# Patient Record
Sex: MALE | Race: WHITE | NOT HISPANIC OR LATINO
[De-identification: names, ages, dates, MRNs, and addresses within clinical notes are randomized per-mention and may not be internally consistent; named-entity substitution may affect disease eponyms.]

---

## 2020-02-25 ENCOUNTER — APPOINTMENT (OUTPATIENT)
Dept: MRI IMAGING | Facility: CLINIC | Age: 60
End: 2020-02-25
Payer: COMMERCIAL

## 2020-02-25 ENCOUNTER — OUTPATIENT (OUTPATIENT)
Dept: OUTPATIENT SERVICES | Facility: HOSPITAL | Age: 60
LOS: 1 days | End: 2020-02-25
Payer: COMMERCIAL

## 2020-02-25 DIAGNOSIS — Z00.8 ENCOUNTER FOR OTHER GENERAL EXAMINATION: ICD-10-CM

## 2020-02-25 PROCEDURE — 70551 MRI BRAIN STEM W/O DYE: CPT | Mod: 26

## 2020-02-25 PROCEDURE — 70551 MRI BRAIN STEM W/O DYE: CPT

## 2021-04-21 ENCOUNTER — INPATIENT (INPATIENT)
Facility: HOSPITAL | Age: 61
LOS: 5 days | Discharge: HOME | End: 2021-04-27
Attending: INTERNAL MEDICINE | Admitting: INTERNAL MEDICINE
Payer: COMMERCIAL

## 2021-04-21 VITALS
TEMPERATURE: 98 F | SYSTOLIC BLOOD PRESSURE: 116 MMHG | DIASTOLIC BLOOD PRESSURE: 63 MMHG | HEART RATE: 107 BPM | OXYGEN SATURATION: 97 % | RESPIRATION RATE: 16 BRPM

## 2021-04-21 DIAGNOSIS — Z98.84 BARIATRIC SURGERY STATUS: Chronic | ICD-10-CM

## 2021-04-21 LAB
ALBUMIN SERPL ELPH-MCNC: 4.4 G/DL — SIGNIFICANT CHANGE UP (ref 3.3–5)
ALP SERPL-CCNC: 70 U/L — SIGNIFICANT CHANGE UP (ref 40–120)
ALT FLD-CCNC: 25 U/L — SIGNIFICANT CHANGE UP (ref 4–41)
ANION GAP SERPL CALC-SCNC: 14 MMOL/L — SIGNIFICANT CHANGE UP (ref 7–14)
AST SERPL-CCNC: 21 U/L — SIGNIFICANT CHANGE UP (ref 4–40)
BASOPHILS # BLD AUTO: 0.05 K/UL — SIGNIFICANT CHANGE UP (ref 0–0.2)
BASOPHILS NFR BLD AUTO: 0.5 % — SIGNIFICANT CHANGE UP (ref 0–2)
BILIRUB SERPL-MCNC: 0.6 MG/DL — SIGNIFICANT CHANGE UP (ref 0.2–1.2)
BLOOD GAS VENOUS COMPREHENSIVE RESULT: SIGNIFICANT CHANGE UP
BUN SERPL-MCNC: 12 MG/DL — SIGNIFICANT CHANGE UP (ref 7–23)
CALCIUM SERPL-MCNC: 9.9 MG/DL — SIGNIFICANT CHANGE UP (ref 8.4–10.5)
CHLORIDE SERPL-SCNC: 101 MMOL/L — SIGNIFICANT CHANGE UP (ref 98–107)
CO2 SERPL-SCNC: 23 MMOL/L — SIGNIFICANT CHANGE UP (ref 22–31)
CREAT SERPL-MCNC: 0.86 MG/DL — SIGNIFICANT CHANGE UP (ref 0.5–1.3)
EOSINOPHIL # BLD AUTO: 0.17 K/UL — SIGNIFICANT CHANGE UP (ref 0–0.5)
EOSINOPHIL NFR BLD AUTO: 1.6 % — SIGNIFICANT CHANGE UP (ref 0–6)
GLUCOSE SERPL-MCNC: 71 MG/DL — SIGNIFICANT CHANGE UP (ref 70–99)
HCT VFR BLD CALC: 49.9 % — SIGNIFICANT CHANGE UP (ref 39–50)
HGB BLD-MCNC: 17 G/DL — SIGNIFICANT CHANGE UP (ref 13–17)
IANC: 7.16 K/UL — SIGNIFICANT CHANGE UP (ref 1.5–8.5)
IMM GRANULOCYTES NFR BLD AUTO: 0.5 % — SIGNIFICANT CHANGE UP (ref 0–1.5)
LYMPHOCYTES # BLD AUTO: 2.36 K/UL — SIGNIFICANT CHANGE UP (ref 1–3.3)
LYMPHOCYTES # BLD AUTO: 21.9 % — SIGNIFICANT CHANGE UP (ref 13–44)
MAGNESIUM SERPL-MCNC: 2.2 MG/DL — SIGNIFICANT CHANGE UP (ref 1.6–2.6)
MCHC RBC-ENTMCNC: 32.6 PG — SIGNIFICANT CHANGE UP (ref 27–34)
MCHC RBC-ENTMCNC: 34.1 GM/DL — SIGNIFICANT CHANGE UP (ref 32–36)
MCV RBC AUTO: 95.8 FL — SIGNIFICANT CHANGE UP (ref 80–100)
MONOCYTES # BLD AUTO: 1 K/UL — HIGH (ref 0–0.9)
MONOCYTES NFR BLD AUTO: 9.3 % — SIGNIFICANT CHANGE UP (ref 2–14)
NEUTROPHILS # BLD AUTO: 7.16 K/UL — SIGNIFICANT CHANGE UP (ref 1.8–7.4)
NEUTROPHILS NFR BLD AUTO: 66.2 % — SIGNIFICANT CHANGE UP (ref 43–77)
NRBC # BLD: 0 /100 WBCS — SIGNIFICANT CHANGE UP
NRBC # FLD: 0 K/UL — SIGNIFICANT CHANGE UP
PHOSPHATE SERPL-MCNC: 5.3 MG/DL — HIGH (ref 2.5–4.5)
PLATELET # BLD AUTO: 285 K/UL — SIGNIFICANT CHANGE UP (ref 150–400)
POTASSIUM SERPL-MCNC: 4 MMOL/L — SIGNIFICANT CHANGE UP (ref 3.5–5.3)
POTASSIUM SERPL-SCNC: 4 MMOL/L — SIGNIFICANT CHANGE UP (ref 3.5–5.3)
PROT SERPL-MCNC: 7.3 G/DL — SIGNIFICANT CHANGE UP (ref 6–8.3)
RBC # BLD: 5.21 M/UL — SIGNIFICANT CHANGE UP (ref 4.2–5.8)
RBC # FLD: 12.2 % — SIGNIFICANT CHANGE UP (ref 10.3–14.5)
SODIUM SERPL-SCNC: 138 MMOL/L — SIGNIFICANT CHANGE UP (ref 135–145)
WBC # BLD: 10.79 K/UL — HIGH (ref 3.8–10.5)
WBC # FLD AUTO: 10.79 K/UL — HIGH (ref 3.8–10.5)

## 2021-04-21 PROCEDURE — 71275 CT ANGIOGRAPHY CHEST: CPT | Mod: 26

## 2021-04-21 PROCEDURE — 99285 EMERGENCY DEPT VISIT HI MDM: CPT

## 2021-04-21 PROCEDURE — 74174 CTA ABD&PLVS W/CONTRAST: CPT | Mod: 26

## 2021-04-21 PROCEDURE — 70498 CT ANGIOGRAPHY NECK: CPT | Mod: 26

## 2021-04-21 PROCEDURE — 70496 CT ANGIOGRAPHY HEAD: CPT | Mod: 26

## 2021-04-21 PROCEDURE — 71045 X-RAY EXAM CHEST 1 VIEW: CPT | Mod: 26

## 2021-04-21 RX ORDER — SODIUM CHLORIDE 9 MG/ML
1000 INJECTION, SOLUTION INTRAVENOUS ONCE
Refills: 0 | Status: COMPLETED | OUTPATIENT
Start: 2021-04-21 | End: 2021-04-21

## 2021-04-21 RX ADMIN — SODIUM CHLORIDE 1000 MILLILITER(S): 9 INJECTION, SOLUTION INTRAVENOUS at 17:39

## 2021-04-21 NOTE — ED PROVIDER NOTE - PROGRESS NOTE DETAILS
HARISH PRIEST: Patient signed out to me to f/u CTA head & neck, admit for syncope. Discussed with attending, noted, /64 ([id number]), given back pain & syncope, will order CTA chest & abd/pelvis to evaluate for dissection. Patient reassessed, sitting comfortably in bed in NAD, denies any chest pain; admits having lower back pain w/o radiculopathy, 10 days ago after some shopping at the supermarket. Will continue to monitor and reassess. HARISH PRIEST: CTA head & neck: neg acute finding, CTA chest & A/P neg dissection. Spoke with tele doc, Dr. Alvarez, accepted pt for admission. MAR text paged. Pt admitted for syncope.

## 2021-04-21 NOTE — ED ADULT TRIAGE NOTE - CHIEF COMPLAINT QUOTE
Pt presents to ED via EMS from home with c/o syncopal episode. Pt reports feeling dizzy when standing up from sitting down. Pt has hx of DM.

## 2021-04-21 NOTE — ED PROVIDER NOTE - OBJECTIVE STATEMENT
61M pmh DM, lap band surg 1/2021 had neg cardiac workup w/ stress + echo 9/2020 @ [facility] presents to ED for 1 episode syncope at 730 this am after getting up from bed, pt states he felt lightheaded, tried to hold himself up but felt dizzy, only remembers being on floor afterwards, he climbed in bed, slept for a few hours, stood up to urinate and felt like he was going to pass out, called 911. Pt denies chest pain, denies sob, denies any other symptoms, no prior syncope history, pt states he has been taking his medication as normal, no other changes. Pt denies room spinning dizziness.

## 2021-04-21 NOTE — ED PROVIDER NOTE - PHYSICAL EXAMINATION
CONSTITUTIONAL: well-appearing, in NAD  SKIN: Warm dry, normal skin turgor  HEAD: NCAT  EYES: EOMI, PERRLA, no scleral icterus, conjunctiva pink  ENT: normal pharynx with no erythema or exudates  NECK: Supple; non tender. Full ROM.  CARD: RRR, no murmurs.  RESP: clear to ausculation b/l. No crackles or wheezing.  ABD: soft, non-tender, non-distended, no rebound or guarding.  EXT: No pedal edema, no calf tenderness  NEURO: normal motor. normal sensory. CN II-XII intact. Cerebellar testing normal. Normal gait but pt felt lightheaded after testing.  PSYCH: Cooperative, appropriate.

## 2021-04-21 NOTE — ED PROVIDER NOTE - NS ED ROS FT
Constitutional:  (-) fever, (-) chills, (-) lethargy  Eyes:  (-) eye pain (-) visual changes  ENMT: (-) nasal discharge, (-) sore throat. (-) neck pain or stiffness  Cardiac: (-) chest pain (-) palpitations  Respiratory:  (-) cough (-) respiratory distress.   GI:  (-) nausea (-) vomiting (-) diarrhea (-) abdominal pain.  :  (-) dysuria (-) frequency (-) burning.  MS:  (-) back pain (-) joint pain.  Neuro:  (-) headache (-) numbness (-) tingling (-) focal weakness (+) lightheadedness  Skin:  (-) rash  Except as documented in the HPI,  all other systems are negative

## 2021-04-21 NOTE — ED PROVIDER NOTE - CLINICAL SUMMARY MEDICAL DECISION MAKING FREE TEXT BOX
61M pmh DM, lap band surg 1/2021 had neg cardiac workup w/ stress + echo 9/2020 @ [facility] presents to ED for 1 episode syncope at 730 this am after getting up from bed, pt states he felt lightheaded, tried to hold himself up but felt dizzy, had syncopal episode + subsequent episode new onset no hx, concern for intracranial process, cta head neck cbc cmp mag phos r/o electrolyte abnormality ekg r/o dysrhythmia

## 2021-04-21 NOTE — ED PROVIDER NOTE - ATTENDING CONTRIBUTION TO CARE
Attending note:   After face to face evaluation of this patient, I concur with above noted hx, pe, and care plan for this patient.  Silverio: 61 yom with DM, lap band, "negative cardiac workup" in Sept 20 after having back pain. This am woke up with back pain, went back to sleep, woke up feeling lightheaded and tried to grab something but woke up on the ground, went back to bed. Sxs returned and still felt lightheaded but called cab to go to [facility] but then had near syncope and called 911. Pt has no previous episode of similar sxs, no cp, no sob. Back pain resolved. Pt is well appearing, no distress, clear lungs, normal cardiac, abd soft and non tender, pulses strong in all ext, no CVAT, neuro and msk unremarkable. EKG borderline tachy, CT head with concern for posterior issues, and CTA for dissection given back pain and syncope. Labs, tele monitor. and admission.

## 2021-04-21 NOTE — ED ADULT NURSE REASSESSMENT NOTE - NS ED NURSE REASSESS COMMENT FT1
reprot received from MIKAEL Alejo pt A&Ox4 states he is a diabetic and has not eaten all day, asks what he is waiting for. plan of care explained and pt provided with meal tray and water pitcher after clearing with MD Silverio. pt awake and alert appears in no distress at this time.

## 2021-04-22 DIAGNOSIS — E11.9 TYPE 2 DIABETES MELLITUS WITHOUT COMPLICATIONS: ICD-10-CM

## 2021-04-22 DIAGNOSIS — R55 SYNCOPE AND COLLAPSE: ICD-10-CM

## 2021-04-22 DIAGNOSIS — K95.09 OTHER COMPLICATIONS OF GASTRIC BAND PROCEDURE: ICD-10-CM

## 2021-04-22 LAB
ANION GAP SERPL CALC-SCNC: 12 MMOL/L — SIGNIFICANT CHANGE UP (ref 7–14)
APPEARANCE UR: ABNORMAL
BILIRUB UR-MCNC: NEGATIVE — SIGNIFICANT CHANGE UP
BUN SERPL-MCNC: 10 MG/DL — SIGNIFICANT CHANGE UP (ref 7–23)
CALCIUM SERPL-MCNC: 9.4 MG/DL — SIGNIFICANT CHANGE UP (ref 8.4–10.5)
CHLORIDE SERPL-SCNC: 98 MMOL/L — SIGNIFICANT CHANGE UP (ref 98–107)
CO2 SERPL-SCNC: 26 MMOL/L — SIGNIFICANT CHANGE UP (ref 22–31)
COLOR SPEC: YELLOW — SIGNIFICANT CHANGE UP
CREAT SERPL-MCNC: 0.88 MG/DL — SIGNIFICANT CHANGE UP (ref 0.5–1.3)
DIFF PNL FLD: NEGATIVE — SIGNIFICANT CHANGE UP
GLUCOSE BLDC GLUCOMTR-MCNC: 100 MG/DL — HIGH (ref 70–99)
GLUCOSE BLDC GLUCOMTR-MCNC: 103 MG/DL — HIGH (ref 70–99)
GLUCOSE BLDC GLUCOMTR-MCNC: 106 MG/DL — HIGH (ref 70–99)
GLUCOSE BLDC GLUCOMTR-MCNC: 119 MG/DL — HIGH (ref 70–99)
GLUCOSE SERPL-MCNC: 142 MG/DL — HIGH (ref 70–99)
GLUCOSE UR QL: ABNORMAL
HCT VFR BLD CALC: 49.1 % — SIGNIFICANT CHANGE UP (ref 39–50)
HGB BLD-MCNC: 16.6 G/DL — SIGNIFICANT CHANGE UP (ref 13–17)
KETONES UR-MCNC: NEGATIVE — SIGNIFICANT CHANGE UP
LEUKOCYTE ESTERASE UR-ACNC: NEGATIVE — SIGNIFICANT CHANGE UP
MAGNESIUM SERPL-MCNC: 2.2 MG/DL — SIGNIFICANT CHANGE UP (ref 1.6–2.6)
MCHC RBC-ENTMCNC: 33 PG — SIGNIFICANT CHANGE UP (ref 27–34)
MCHC RBC-ENTMCNC: 33.8 GM/DL — SIGNIFICANT CHANGE UP (ref 32–36)
MCV RBC AUTO: 97.6 FL — SIGNIFICANT CHANGE UP (ref 80–100)
NITRITE UR-MCNC: NEGATIVE — SIGNIFICANT CHANGE UP
NRBC # BLD: 0 /100 WBCS — SIGNIFICANT CHANGE UP
NRBC # FLD: 0 K/UL — SIGNIFICANT CHANGE UP
PH UR: 6 — SIGNIFICANT CHANGE UP (ref 5–8)
PHOSPHATE SERPL-MCNC: 4.2 MG/DL — SIGNIFICANT CHANGE UP (ref 2.5–4.5)
PLATELET # BLD AUTO: 247 K/UL — SIGNIFICANT CHANGE UP (ref 150–400)
POTASSIUM SERPL-MCNC: 4 MMOL/L — SIGNIFICANT CHANGE UP (ref 3.5–5.3)
POTASSIUM SERPL-SCNC: 4 MMOL/L — SIGNIFICANT CHANGE UP (ref 3.5–5.3)
PROT UR-MCNC: NEGATIVE — SIGNIFICANT CHANGE UP
RBC # BLD: 5.03 M/UL — SIGNIFICANT CHANGE UP (ref 4.2–5.8)
RBC # FLD: 12.5 % — SIGNIFICANT CHANGE UP (ref 10.3–14.5)
SARS-COV-2 RNA SPEC QL NAA+PROBE: SIGNIFICANT CHANGE UP
SODIUM SERPL-SCNC: 136 MMOL/L — SIGNIFICANT CHANGE UP (ref 135–145)
SP GR SPEC: 1.04 — HIGH (ref 1.01–1.02)
UROBILINOGEN FLD QL: ABNORMAL
WBC # BLD: 7.81 K/UL — SIGNIFICANT CHANGE UP (ref 3.8–10.5)
WBC # FLD AUTO: 7.81 K/UL — SIGNIFICANT CHANGE UP (ref 3.8–10.5)

## 2021-04-22 PROCEDURE — 99223 1ST HOSP IP/OBS HIGH 75: CPT

## 2021-04-22 RX ORDER — DEXTROSE 50 % IN WATER 50 %
15 SYRINGE (ML) INTRAVENOUS ONCE
Refills: 0 | Status: DISCONTINUED | OUTPATIENT
Start: 2021-04-22 | End: 2021-04-27

## 2021-04-22 RX ORDER — INSULIN LISPRO 100/ML
VIAL (ML) SUBCUTANEOUS
Refills: 0 | Status: DISCONTINUED | OUTPATIENT
Start: 2021-04-22 | End: 2021-04-27

## 2021-04-22 RX ORDER — DEXTROSE 50 % IN WATER 50 %
25 SYRINGE (ML) INTRAVENOUS ONCE
Refills: 0 | Status: DISCONTINUED | OUTPATIENT
Start: 2021-04-22 | End: 2021-04-27

## 2021-04-22 RX ORDER — SODIUM CHLORIDE 9 MG/ML
1000 INJECTION INTRAMUSCULAR; INTRAVENOUS; SUBCUTANEOUS
Refills: 0 | Status: DISCONTINUED | OUTPATIENT
Start: 2021-04-22 | End: 2021-04-27

## 2021-04-22 RX ORDER — METFORMIN HYDROCHLORIDE 850 MG/1
0 TABLET ORAL
Qty: 0 | Refills: 0 | DISCHARGE

## 2021-04-22 RX ORDER — EMPAGLIFLOZIN 10 MG/1
0 TABLET, FILM COATED ORAL
Qty: 0 | Refills: 0 | DISCHARGE

## 2021-04-22 RX ORDER — SODIUM CHLORIDE 9 MG/ML
1000 INJECTION, SOLUTION INTRAVENOUS
Refills: 0 | Status: DISCONTINUED | OUTPATIENT
Start: 2021-04-22 | End: 2021-04-27

## 2021-04-22 RX ORDER — GLUCAGON INJECTION, SOLUTION 0.5 MG/.1ML
1 INJECTION, SOLUTION SUBCUTANEOUS ONCE
Refills: 0 | Status: DISCONTINUED | OUTPATIENT
Start: 2021-04-22 | End: 2021-04-27

## 2021-04-22 RX ORDER — INSULIN LISPRO 100/ML
VIAL (ML) SUBCUTANEOUS AT BEDTIME
Refills: 0 | Status: DISCONTINUED | OUTPATIENT
Start: 2021-04-22 | End: 2021-04-27

## 2021-04-22 RX ORDER — DEXTROSE 50 % IN WATER 50 %
12.5 SYRINGE (ML) INTRAVENOUS ONCE
Refills: 0 | Status: DISCONTINUED | OUTPATIENT
Start: 2021-04-22 | End: 2021-04-27

## 2021-04-22 RX ADMIN — SODIUM CHLORIDE 100 MILLILITER(S): 9 INJECTION INTRAMUSCULAR; INTRAVENOUS; SUBCUTANEOUS at 12:07

## 2021-04-22 NOTE — H&P ADULT - HISTORY OF PRESENT ILLNESS
Patient is a 62 y/o M PMH DM2, lap band surg 1/2021 had neg cardiac workup w/ stress + echo 9/2020 @ [facility] p/w syncope 4/21 in AM. Reports feeling groggy upon awakening on 4/21, felt lightheaded when standing up, went back to bed. Woke up several hours later, ate breakfast, stood up, felt lightheaded again, unsteady, woke up on the floor. Reports no recent illness, regular PO intake, FS normal at the times of incidents.

## 2021-04-22 NOTE — H&P ADULT - ASSESSMENT
61 M PMH DM2, lap band surg 1/2021 had neg cardiac workup w/ stress + echo 9/2020 @ [facility] p/w syncope

## 2021-04-22 NOTE — H&P ADULT - NSHPPHYSICALEXAM_GEN_ALL_CORE
T(C): 36.7 (04-22-21 @ 01:45), Max: 36.9 (04-21-21 @ 16:47)  HR: 84 (04-22-21 @ 01:45) (84 - 107)  BP: 121/69 (04-22-21 @ 01:45) (100/64 - 121/69)  RR: 21 (04-22-21 @ 01:45) (16 - 21)  SpO2: 98% (04-22-21 @ 01:45) (97% - 98%)    Constitutional: NAD, well-developed, well-nourished  Ears, Nose, Mouth, and Throat: normal external ears and nose, normal hearing, moist oral mucosa  Eyes: normal conjunctiva, EOMI, PERRL  Neck: supple, no JVD  Respiratory: Clear to auscultation bilaterally. No wheezes, rales or rhonchi. Normal respiratory effort  Cardiovascular: RRR, no M/R/G, no edema, 2+ Peripheral Pulses  Gastrointestinal: soft, nontender, nondistended, +BS, no hernia  Skin: warm, dry, no rash  Neurologic: sensation grossly intact, CN grossly intact, non-focal exam  Musculoskeletal: no clubbing, no cyanosis, no joint swelling  Psychiatric: AOX3, appropriate mood, affect

## 2021-04-22 NOTE — CONSULT NOTE ADULT - ATTENDING COMMENTS
Patient seen and examined.  Agree with above.   Admitted with syncope - found to have orthostatic hypotension   IVF and repeat orthostatics  Check TTE  EP eval for possible ILR    Dilan Rolon MD

## 2021-04-22 NOTE — PROVIDER CONTACT NOTE (OTHER) - ASSESSMENT
Patient orthostatics positive. Patient endorses dizziness during orthostatic check. Patient denies chest pain, headache, shortness of breath.

## 2021-04-23 LAB
A1C WITH ESTIMATED AVERAGE GLUCOSE RESULT: 5.9 % — HIGH (ref 4–5.6)
ANION GAP SERPL CALC-SCNC: 9 MMOL/L — SIGNIFICANT CHANGE UP (ref 7–14)
BUN SERPL-MCNC: 10 MG/DL — SIGNIFICANT CHANGE UP (ref 7–23)
CALCIUM SERPL-MCNC: 8.9 MG/DL — SIGNIFICANT CHANGE UP (ref 8.4–10.5)
CHLORIDE SERPL-SCNC: 103 MMOL/L — SIGNIFICANT CHANGE UP (ref 98–107)
CO2 SERPL-SCNC: 23 MMOL/L — SIGNIFICANT CHANGE UP (ref 22–31)
COVID-19 SPIKE DOMAIN AB INTERP: POSITIVE
COVID-19 SPIKE DOMAIN ANTIBODY RESULT: >250 U/ML — HIGH
CREAT SERPL-MCNC: 0.75 MG/DL — SIGNIFICANT CHANGE UP (ref 0.5–1.3)
ESTIMATED AVERAGE GLUCOSE: 123 MG/DL — HIGH (ref 68–114)
GLUCOSE BLDC GLUCOMTR-MCNC: 115 MG/DL — HIGH (ref 70–99)
GLUCOSE BLDC GLUCOMTR-MCNC: 119 MG/DL — HIGH (ref 70–99)
GLUCOSE BLDC GLUCOMTR-MCNC: 129 MG/DL — HIGH (ref 70–99)
GLUCOSE BLDC GLUCOMTR-MCNC: 99 MG/DL — SIGNIFICANT CHANGE UP (ref 70–99)
GLUCOSE SERPL-MCNC: 109 MG/DL — HIGH (ref 70–99)
HCV AB S/CO SERPL IA: 0.1 S/CO — SIGNIFICANT CHANGE UP (ref 0–0.99)
HCV AB SERPL-IMP: SIGNIFICANT CHANGE UP
MAGNESIUM SERPL-MCNC: 2.3 MG/DL — SIGNIFICANT CHANGE UP (ref 1.6–2.6)
PHOSPHATE SERPL-MCNC: 3.9 MG/DL — SIGNIFICANT CHANGE UP (ref 2.5–4.5)
POTASSIUM SERPL-MCNC: 4 MMOL/L — SIGNIFICANT CHANGE UP (ref 3.5–5.3)
POTASSIUM SERPL-SCNC: 4 MMOL/L — SIGNIFICANT CHANGE UP (ref 3.5–5.3)
SARS-COV-2 IGG+IGM SERPL QL IA: >250 U/ML — HIGH
SARS-COV-2 IGG+IGM SERPL QL IA: POSITIVE
SODIUM SERPL-SCNC: 135 MMOL/L — SIGNIFICANT CHANGE UP (ref 135–145)

## 2021-04-23 RX ORDER — THIAMINE MONONITRATE (VIT B1) 100 MG
500 TABLET ORAL THREE TIMES A DAY
Refills: 0 | Status: COMPLETED | OUTPATIENT
Start: 2021-04-23 | End: 2021-04-24

## 2021-04-23 RX ORDER — MECLIZINE HCL 12.5 MG
12.5 TABLET ORAL
Refills: 0 | Status: DISCONTINUED | OUTPATIENT
Start: 2021-04-23 | End: 2021-04-26

## 2021-04-23 RX ORDER — LIDOCAINE 4 G/100G
1 CREAM TOPICAL DAILY
Refills: 0 | Status: DISCONTINUED | OUTPATIENT
Start: 2021-04-23 | End: 2021-04-27

## 2021-04-23 RX ADMIN — Medication 105 MILLIGRAM(S): at 23:42

## 2021-04-23 RX ADMIN — Medication 12.5 MILLIGRAM(S): at 23:41

## 2021-04-23 RX ADMIN — LIDOCAINE 1 PATCH: 4 CREAM TOPICAL at 20:37

## 2021-04-23 NOTE — PROGRESS NOTE ADULT - SUBJECTIVE AND OBJECTIVE BOX
EP Attending    HISTORY OF PRESENT ILLNESS:   Patient is a 62 y/o M PMH DM2, lap band surg 1/2021 had neg cardiac workup w/ stress + echo 9/2020 @ [facility] p/w syncope 4/21 in AM. Reports feeling groggy upon awakening on 4/21, felt lightheaded when standing up, went back to bed. Woke up several hours later, ate breakfast, stood up, felt lightheaded again, unsteady, woke up on the floor. Reports no recent illness, regular PO intake, FS normal at the times of incidents. (22 Apr 2021 05:10)    Mr Hahn reports undergoing lap-band surgery ~4-5 months ago, and has done well since with ~50lbs of weight loss.  Often regurgitates solid foods that don't fit thru the band. He reports being happy w/ results and is working his diet around this issue.  He describes multiple episodes of postural collapse with loss of consciousness on the day of admission, starting first-thing in the morning upon waking up, and not improving with eating a few small meals and having further rest in bed.  He describes what initially sounds like a vertiginous component of dysequilibrium, "walking like hes drunk... bouncing from wall to wall unable to maintain a stable direction", but then develops a presyncopal lightheadedness with blurry vision and feeling 'foggy'.  He then recalls gaining consciousness on the floor without recalling falling down.  No significant injuries sustained.  Symptoms worsen with lying-->siting, and sitting-->standing.  He has only transferred from bed-to-wheelchair and vice versa in the ED, with symptoms, and has not walked in the ED for fear of fainting again.  He has no prior episodes of lightheadedness or fainting.    He is not experiencing any palpitations, angina, orthopnea/PND, pleuritic chest pain or leg cramping.  Has chronic low-back-pain that is active at this time.  He does not drink smoke or use drugs.  A 10 pt ROS is otherwise negative.    4/23- feeling a bit better today. able to ambulate in room with some lightheaded "head-buzzing" sensation but no falls or near-falls.  no headache.  no palpitations.  remarks that orthostatic vitals were performed and he still has large increases in HR from sitting to standing (20+bpm)    dextrose 40% Gel 15 Gram(s) Oral once  dextrose 5%. 1000 milliLiter(s) IV Continuous <Continuous>  dextrose 5%. 1000 milliLiter(s) IV Continuous <Continuous>  dextrose 50% Injectable 25 Gram(s) IV Push once  dextrose 50% Injectable 12.5 Gram(s) IV Push once  dextrose 50% Injectable 25 Gram(s) IV Push once  glucagon  Injectable 1 milliGRAM(s) IntraMuscular once  insulin lispro (ADMELOG) corrective regimen sliding scale   SubCutaneous three times a day before meals  insulin lispro (ADMELOG) corrective regimen sliding scale   SubCutaneous at bedtime  sodium chloride 0.9%. 1000 milliLiter(s) IV Continuous <Continuous>                        16.6   7.81  )-----------( 247      ( 22 Apr 2021 11:20 )             49.1       04-23    135  |  103  |  10  ----------------------------<  109<H>  4.0   |  23  |  0.75    Ca    8.9      23 Apr 2021 06:20  Phos  3.9     04-23  Mg     2.3     04-23    TPro  7.3  /  Alb  4.4  /  TBili  0.6  /  DBili  x   /  AST  21  /  ALT  25  /  AlkPhos  70  04-21      T(C): 36.8 (04-23-21 @ 12:11), Max: 36.8 (04-23-21 @ 12:11)  HR: 81 (04-23-21 @ 12:11) (78 - 85)  BP: 102/65 (04-23-21 @ 12:11) (102/65 - 127/71)  RR: 17 (04-23-21 @ 12:11) (17 - 19)  SpO2: 96% (04-23-21 @ 12:11) (95% - 99%)  Wt(kg): --    I&O's Summary    General: Well nourished, no acute distress, alert and oriented x 3  Head: normocephalic, no trauma  Neck: no JVD, no bruit, supple, not enlarged  CV: S1S2, no S3, regular rate, rhythm is SINUS, no murmurs.    Lungs: clear BL, no rales or wheezes  Abdomen: bowel sounds +, soft, nontender, nondistended  Extremities: no clubbing, cyanosis or edema  Neuro: Moves all 4 extremities, sensation intact x 4 extremities  Skin: warm and moist, normal turgor  Psych: Mood and affect are appropriate for circumstances  MSK: normal range of motion and strength x4 extremities.    TELEMETRY: NSR.  Orthostatic increase in HR from 82 --> 98bpm, from lying to sitting, reproducible.	    ECG: NSR  Echo: pending  CTA: No dissection, lap-band in horizontal position / ? slippage	      ASSESSMENT/PLAN: 	61y Male subacutely s/p lap-band surgery, presented with syncopal episodes on day of arrival.  Mechanism of syncope is consistent with orthostatis.    He has flat neck veins and no leg edema.  Is less symptomatic sitting up but still with some mild symptoms during standing.  Check echocardiogram.  ? of autonomic dysfunction, given history of diabetes  Neuro consult pending.    David Avery M.D.  Cardiac Electrophysiology    office [phone]  pager [phone]

## 2021-04-23 NOTE — PROGRESS NOTE ADULT - SUBJECTIVE AND OBJECTIVE BOX
Patient is a 61y old  Male who presents with a chief complaint of syncope (2021 13:22)    Date of servie : 21 @ 13:33  INTERVAL HPI/OVERNIGHT EVENTS:  T(C): 36.8 (21 @ 12:11), Max: 36.8 (21 @ 12:11)  HR: 81 (21 @ 12:11) (78 - 85)  BP: 102/65 (21 @ 12:11) (102/65 - 127/71)  RR: 17 (21 @ 12:11) (17 - 19)  SpO2: 96% (21 @ 12:11) (95% - 99%)  Wt(kg): --  I&O's Summary      LABS:                        16.6   7.81  )-----------( 247      ( 2021 11:20 )             49.1         135  |  103  |  10  ----------------------------<  109<H>  4.0   |  23  |  0.75    Ca    8.9      2021 06:20  Phos  3.9       Mg     2.3         TPro  7.3  /  Alb  4.4  /  TBili  0.6  /  DBili  x   /  AST  21  /  ALT  25  /  AlkPhos  70        Urinalysis Basic - ( 2021 00:14 )    Color: Yellow / Appearance: Slightly Turbid / S.043 / pH: x  Gluc: x / Ketone: Negative  / Bili: Negative / Urobili: 3 mg/dL   Blood: x / Protein: Negative / Nitrite: Negative   Leuk Esterase: Negative / RBC: x / WBC x   Sq Epi: x / Non Sq Epi: x / Bacteria: x      CAPILLARY BLOOD GLUCOSE      POCT Blood Glucose.: 129 mg/dL (2021 11:55)  POCT Blood Glucose.: 99 mg/dL (2021 07:38)  POCT Blood Glucose.: 119 mg/dL (2021 21:25)  POCT Blood Glucose.: 106 mg/dL (2021 17:26)        Urinalysis Basic - ( 2021 00:14 )    Color: Yellow / Appearance: Slightly Turbid / S.043 / pH: x  Gluc: x / Ketone: Negative  / Bili: Negative / Urobili: 3 mg/dL   Blood: x / Protein: Negative / Nitrite: Negative   Leuk Esterase: Negative / RBC: x / WBC x   Sq Epi: x / Non Sq Epi: x / Bacteria: x        MEDICATIONS  (STANDING):  dextrose 40% Gel 15 Gram(s) Oral once  dextrose 5%. 1000 milliLiter(s) (50 mL/Hr) IV Continuous <Continuous>  dextrose 5%. 1000 milliLiter(s) (100 mL/Hr) IV Continuous <Continuous>  dextrose 50% Injectable 25 Gram(s) IV Push once  dextrose 50% Injectable 12.5 Gram(s) IV Push once  dextrose 50% Injectable 25 Gram(s) IV Push once  glucagon  Injectable 1 milliGRAM(s) IntraMuscular once  insulin lispro (ADMELOG) corrective regimen sliding scale   SubCutaneous three times a day before meals  insulin lispro (ADMELOG) corrective regimen sliding scale   SubCutaneous at bedtime  sodium chloride 0.9%. 1000 milliLiter(s) (100 mL/Hr) IV Continuous <Continuous>    MEDICATIONS  (PRN):          PHYSICAL EXAM:  GENERAL: NAD, well-groomed, well-developed  HEAD:  Atraumatic, Normocephalic  CHEST/LUNG: Clear to percussion bilaterally; No rales, rhonchi, wheezing, or rubs  HEART: Regular rate and rhythm; No murmurs, rubs, or gallops  ABDOMEN: Soft, Nontender, Nondistended; Bowel sounds present  EXTREMITIES:  2+ Peripheral Pulses, No clubbing, cyanosis, or edema  LYMPH: No lymphadenopathy noted  SKIN: No rashes or lesions    Care Discussed with Consultants/Other Providers [ ] YES  [ ] NO

## 2021-04-23 NOTE — DIETITIAN INITIAL EVALUATION ADULT. - OTHER INFO
RD visited with patient for requested consultation - Bariatric Surgery.  Patient reported that he had his bariatric surgery in January 2021.  Patient reported good appetite, but reported that he has been "regurgitating" some foods, which he attributes to eating foods here in the hospital that he does not normally eat, and further reported he was not eating as slowly as he should.  Patient endorses his weight has been ~295-301 pounds PTA; reported height of 6'2"; patient stated he was working on losing weight, as he reported that over the past year it has been a struggle due to the pandemic.    RD reviewed food preferences and reviewed current diet (consistent carbohydrate --- patient reported that he has very well controlled blood sugars at home and at baseline); reviewed importance of small, frequent meals,  fluid intake from meals related to bariatric surgery, which patient has previously been educated on.

## 2021-04-23 NOTE — DIETITIAN INITIAL EVALUATION ADULT. - ADD RECOMMEND
1) Monitor weights, PO intake/diet tolerance, skin integrity, pertinent labs. 2) Honor food preferences as requested; provide meal alternatives PRN.

## 2021-04-23 NOTE — DIETITIAN INITIAL EVALUATION ADULT. - PERTINENT MEDS FT
MEDICATIONS  (STANDING):  dextrose 40% Gel 15 Gram(s) Oral once  dextrose 5%. 1000 milliLiter(s) (50 mL/Hr) IV Continuous <Continuous>  dextrose 5%. 1000 milliLiter(s) (100 mL/Hr) IV Continuous <Continuous>  dextrose 50% Injectable 25 Gram(s) IV Push once  dextrose 50% Injectable 12.5 Gram(s) IV Push once  dextrose 50% Injectable 25 Gram(s) IV Push once  glucagon  Injectable 1 milliGRAM(s) IntraMuscular once  insulin lispro (ADMELOG) corrective regimen sliding scale   SubCutaneous three times a day before meals  insulin lispro (ADMELOG) corrective regimen sliding scale   SubCutaneous at bedtime  sodium chloride 0.9%. 1000 milliLiter(s) (100 mL/Hr) IV Continuous <Continuous>    MEDICATIONS  (PRN):

## 2021-04-23 NOTE — CONSULT NOTE ADULT - ASSESSMENT
60 y/o M PMH DM2, lap band surg 1/2021 had neg cardiac workup w/ stress & echo 9/2020 @ [facility]   p/w syncope 4/21 in AM.    #syncope  Tele monitoring  ruled out ACS  +orthostasis  TTE pending  cards, EP cs noted    # DM2- correction scale  hba1c 5.9    #DVT ppx    PCP- [facility] Borges    [facility]  [phone]

## 2021-04-23 NOTE — CONSULT NOTE ADULT - SUBJECTIVE AND OBJECTIVE BOX
HISTORY OF PRESENT ILLNESS: HPI:    Patient is a 60 y/o M PMH DM2 x 10 years, Diabetic neuropathy in L foot, s/p lap band surg 1/2021,  reports neg cardiac workup w/ stress + echo 9/2020 @ [facility] admitted with syncope.  Pt states he lost consciousness when he stood up, and woke up on the floor.  He is noted to be significantly orthostatic in the ER on my exam.  He reports steady weight loss since gastric surgery.  Reports tolerating PO liquids.  No N/V/Diarrhea/fever.  Reports compliance with DM meds, stable sugars as of recent.    He reports he also had a negative MRI brain in Sept 2020.  He had a work up for dizziness 2 years ago with normal cardiac work up at that time as well.      PAST MEDICAL & SURGICAL HISTORY:  Diabetes    Status post gastric banding      MEDICATIONS  (STANDING):  dextrose 40% Gel 15 Gram(s) Oral once  dextrose 5%. 1000 milliLiter(s) (50 mL/Hr) IV Continuous <Continuous>  dextrose 5%. 1000 milliLiter(s) (100 mL/Hr) IV Continuous <Continuous>  dextrose 50% Injectable 25 Gram(s) IV Push once  dextrose 50% Injectable 12.5 Gram(s) IV Push once  dextrose 50% Injectable 25 Gram(s) IV Push once  glucagon  Injectable 1 milliGRAM(s) IntraMuscular once  insulin lispro (ADMELOG) corrective regimen sliding scale   SubCutaneous three times a day before meals  insulin lispro (ADMELOG) corrective regimen sliding scale   SubCutaneous at bedtime  sodium chloride 0.9%. 1000 milliLiter(s) (100 mL/Hr) IV Continuous <Continuous>    Allergies  No Known Allergies      FAMILY HISTORY:  No pertinent family history in first degree relatives  Non-contributary for premature coronary disease or sudden cardiac death    SOCIAL HISTORY:    [x ] Non-smoker  [ ] Smoker  [ ] Alcohol    FLU VACCINE THIS YEAR STARTS IN AUGUST:  [ ] Yes    [ ] No    IF OVER 65 HAVE YOU EVER HAD A PNA VACCINE:  [ ] Yes    [ ] No       [ ] N/A      REVIEW OF SYSTEMS:  [ ]chest pain  [  ]shortness of breath  [  ]palpitations  [ x ]syncope  [ ]near syncope [ ]upper extremity weakness   [ ] lower extremity weakness  [  ]diplopia  [  ]altered mental status   [  ]fevers  [ ]chills [ ]nausea  [ ]vomitting  [  ]dysphagia    [ ]abdominal pain  [ ]melena  [ ]BRBPR    [  ]epistaxis  [  ]rash    [ ]lower extremity edema        [x ] All others negative	  [ ] Unable to obtain      LABS:	 	    CARDIAC MARKERS:    Trop T <6                          16.6   7.81  )-----------( 247      ( 22 Apr 2021 11:20 )             49.1     136  |  98  |  10  ----------------------------<  142<H>  4.0   |  26  |  0.88    Ca    9.4      22 Apr 2021 11:20  Phos  4.2     04-22  Mg     2.2     04-22    TPro  7.3  /  Alb  4.4  /  TBili  0.6  /  DBili  x   /  AST  21  /  ALT  25  /  AlkPhos  70  04-21    Creatinine Trend: 0.88<--, 0.86<--      PHYSICAL EXAM:  T(C): 36.6 (04-22-21 @ 05:30), Max: 36.9 (04-21-21 @ 16:47)  HR: 87 (04-22-21 @ 05:30) (84 - 107)  BP: 117/68 (04-22-21 @ 05:30) (100/64 - 121/69)  RR: 18 (04-22-21 @ 05:30) (16 - 21)  SpO2: 97% (04-22-21 @ 05:30) (97% - 98%)    Gen: Appears well in NAD  HEENT:  (-)icterus (-)pallor  CV: N S1 S2 1/6 KYLEIGH (+)2 Pulses B/l  Resp:  Clear to ausculatation B/L, normal effort  GI: (+) BS Soft, NT, ND  Lymph:  (-)Edema, (-)obvious lymphadenopathy  Skin: Warm to touch, Normal turgor  Psych: Appropriate mood and affect	      ECG: NSR vrate 98 	    < from: CT Angio Neck w/ IV Cont (04.21.21 @ 23:04) >  IMPRESSION:    CT Head: No acute intracranial hemorrhage or mass effect.    CTA Head:No major vessel occlusion, significant stenosis, dissection, or saccular aneurysm.    CTA Neck: No hemodynamically significant stenosis by NASCET, dissection, or pseudoaneurysm.    < end of copied text >      ASSESSMENT/PLAN: 	Patient is a 60 y/o M PMH DM2 x 10 years, Diabetic neuropathy in L foot, s/p lap band surg 1/2021,  reports neg cardiac workup w/ stress + echo 9/2020 @ [facility] admitted with syncope    --Pt found to be significantly orthostatic, IVF ordered  --admit to tele  --check TTE  --CTA H/N noted  --repeat orthostatics after IVF  --check Hgb A1C        
Patient is a 61y old  Male who presents with a chief complaint of syncope (2021 13:32)      HPI:  Patient is a 60 y/o M PMH DM2, lap band surg 2021 had neg cardiac workup w/ stress + echo 2020 @ [facility] p/w syncope  in AM. Reports feeling groggy upon awakening on , felt lightheaded when standing up, went back to bed. Woke up several hours later, ate breakfast, stood up, felt lightheaded again, unsteady, woke up on the floor. Reports no recent illness, regular PO intake, FS normal at the times of incidents. (2021 05:10)      PAST MEDICAL & SURGICAL HISTORY:  Diabetes    Status post gastric banding        FAMILY HISTORY:  No pertinent family history in first degree relatives        SOCIAL HISTORY:    Allergies    No Known Allergies    Intolerances          REVIEW OF SYSEMS:  General: no weakness, no fever/chills, no weight loss/gain  Skin/Breast: no rash, no jaundice  Ophthalmologic: no vision changes, no dry eyes   Respiratory and Thorax: no cough, no wheezing, no hemoptysis, no dyspnea  Cardiovascular: no chest pain, no shortness of breath, no orthopnea  Gastrointestinal: no n/v/d, no abdominal pain, no dysphagia   Genitourinary: no dysuria, no frequency, no nocturia, no hematuria  Musculoskeletal: no trauma, no sprain/strain, no myalgias, no arthralgias, no fracture  Neurological: no HA, no dizziness, no weakness, no numbness  Psychiatric: no depression, no SI/HI  Hematology/Lymphatics: no easy bruising  Endocrine: no heat or cold intolerance. no weight gain or loss  Allergic/Immunologic: no allergy or recent reaction       Vital Signs Last 24 Hrs  T(C): 36.8 (2021 12:11), Max: 36.8 (2021 12:11)  T(F): 98.3 (2021 12:11), Max: 98.3 (2021 12:11)  HR: 81 (2021 12:11) (78 - 85)  BP: 102/65 (2021 12:11) (102/65 - 127/71)  BP(mean): --  RR: 17 (2021 12:11) (17 - 19)  SpO2: 96% (2021 12:11) (95% - 99%)  I&O's Summary      PHYSICAL EXAM:  GENERAL: NAD, Comfortable  HEAD:  Atraumatic, Normocephalic  EYES: EOMI, PERRLA, conjunctiva and sclera clear  NECK: Supple, No JVD  CHEST/LUNG: Clear to auscultation bilaterally; No wheeze  HEART: Regular rate and rhythm; No murmurs, rubs, or gallops  ABDOMEN: Soft, Nontender, Nondistended; Bowel sounds present  Neuro: AAOx3, no focal deficit, 5/5 b/l extremities  EXTREMITIES:  2+ Peripheral Pulses, No clubbing, cyanosis, or edema  SKIN: No rashes or lesions    LABS:                        16.6   7.81  )-----------( 247      ( 2021 11:20 )             49.1     04-    135  |  103  |  10  ----------------------------<  109<H>  4.0   |  23  |  0.75    Ca    8.9      2021 06:20  Phos  3.9     -  Mg     2.3     -    TPro  7.3  /  Alb  4.4  /  TBili  0.6  /  DBili  x   /  AST  21  /  ALT  25  /  AlkPhos  70  04-      CAPILLARY BLOOD GLUCOSE      POCT Blood Glucose.: 129 mg/dL (2021 11:55)  POCT Blood Glucose.: 99 mg/dL (2021 07:38)  POCT Blood Glucose.: 119 mg/dL (2021 21:25)  POCT Blood Glucose.: 106 mg/dL (2021 17:26)        Urinalysis Basic - ( 2021 00:14 )    Color: Yellow / Appearance: Slightly Turbid / S.043 / pH: x  Gluc: x / Ketone: Negative  / Bili: Negative / Urobili: 3 mg/dL   Blood: x / Protein: Negative / Nitrite: Negative   Leuk Esterase: Negative / RBC: x / WBC x   Sq Epi: x / Non Sq Epi: x / Bacteria: x        RADIOLOGY & ADDITIONAL TESTS:    Imaging Personally Reviewed:  [x] YES  [ ] NO    Consultant(s) Notes Reviewed:  [x] YES  [ ] NO      MEDICATIONS  (STANDING):  dextrose 40% Gel 15 Gram(s) Oral once  dextrose 5%. 1000 milliLiter(s) (50 mL/Hr) IV Continuous <Continuous>  dextrose 5%. 1000 milliLiter(s) (100 mL/Hr) IV Continuous <Continuous>  dextrose 50% Injectable 25 Gram(s) IV Push once  dextrose 50% Injectable 12.5 Gram(s) IV Push once  dextrose 50% Injectable 25 Gram(s) IV Push once  glucagon  Injectable 1 milliGRAM(s) IntraMuscular once  insulin lispro (ADMELOG) corrective regimen sliding scale   SubCutaneous three times a day before meals  insulin lispro (ADMELOG) corrective regimen sliding scale   SubCutaneous at bedtime  sodium chloride 0.9%. 1000 milliLiter(s) (100 mL/Hr) IV Continuous <Continuous>    MEDICATIONS  (PRN):      Care Discussed with Consultants/Other Providers [x] YES  [ ] NO    HEALTH ISSUES - PROBLEM Dx:  Syncope, unspecified syncope type  Syncope, unspecified syncope type    Diabetes mellitus type 2, noninsulin dependent  Diabetes mellitus type 2, noninsulin dependent    Gastric band slippage  Gastric band slippage        
[facility]        Patient is a 61y old  Male who presents with a chief complaint of syncope (2021 14:24)    Excerpt from H&P,"  HPI:  Patient is a 60 y/o M PMH DM2, lap band surg 2021 had neg cardiac workup w/ stress + echo 2020 @ [facility] p/w syncope  in AM. Reports feeling groggy upon awakening on , felt lightheaded when standing up, went back to bed. Woke up several hours later, ate breakfast, stood up, felt lightheaded again, unsteady, woke up on the floor. Reports no recent illness, regular PO intake, FS normal at the times of incidents. (2021 05:10)           *****PAST MEDICAL / Surgical  HISTORY:  PAST MEDICAL & SURGICAL HISTORY:  Diabetes    Status post gastric banding             *****FAMILY HISTORY:  FAMILY HISTORY:  No pertinent family history in first degree relatives             *****SOCIAL HISTORY:  Alcohol: None  Smoking: None         *****ALLERGIES:   Allergies    No Known Allergies    Intolerances             *****MEDICATIONS: current medication reviewed and documented.   MEDICATIONS  (STANDING):  dextrose 40% Gel 15 Gram(s) Oral once  dextrose 5%. 1000 milliLiter(s) (50 mL/Hr) IV Continuous <Continuous>  dextrose 5%. 1000 milliLiter(s) (100 mL/Hr) IV Continuous <Continuous>  dextrose 50% Injectable 25 Gram(s) IV Push once  dextrose 50% Injectable 12.5 Gram(s) IV Push once  dextrose 50% Injectable 25 Gram(s) IV Push once  glucagon  Injectable 1 milliGRAM(s) IntraMuscular once  insulin lispro (ADMELOG) corrective regimen sliding scale   SubCutaneous three times a day before meals  insulin lispro (ADMELOG) corrective regimen sliding scale   SubCutaneous at bedtime  lidocaine   Patch 1 Patch Transdermal daily  sodium chloride 0.9%. 1000 milliLiter(s) (100 mL/Hr) IV Continuous <Continuous>    MEDICATIONS  (PRN):           *****REVIEW OF SYSTEM:  GEN: no fever, no chills, no pain  RESP: no SOB, no cough, no sputum  CVS: no chest pain, no palpitations, no edema  GI: no abdominal pain, no nausea, no vomiting, no constipation, no diarrhea  : no dysurea, no frequency, no hematurea  Neuro: no headache, no dizziness  PSYCH: no anxiety, no depression  Derm : no itching, no rash         *****VITAL SIGNS:  T(C): 36.6 (21 @ 20:29), Max: 36.8 (21 @ 12:11)  HR: 80 (21 @ 20:29) (80 - 83)  BP: 123/78 (21 @ 20:29) (102/65 - 123/78)  RR: 17 (21 @ 20:29) (17 - 18)  SpO2: 97% (21 @ 20:29) (95% - 97%)  Wt(kg): --           *****PHYSICAL EXAM:   Alert oriented x 3   Attention comprehension are fair. Able to name, repeat, read without any difficulty.   Able to follow 3 step commands.     EOMI fundi not visualized,  VFF to confrontration  No facial asymmetry   Tongue is midline   Palate elevates symmetrically   Moving all 4 ext symmetrically no pronator drift   Reflexes are symmetric throughout   sensation is grossly symmetric  Gait : not assessed.  B/L down going toes               *****LAB AND IMAGIN.6   7.81  )-----------( 247      ( 2021 11:20 )             49.1                   135  |  103  |  10  ----------------------------<  109<H>  4.0   |  23  |  0.75    Ca    8.9      2021 06:20  Phos  3.9       Mg     2.3                                   Urinalysis Basic - ( 2021 00:14 )    Color: Yellow / Appearance: Slightly Turbid / S.043 / pH: x  Gluc: x / Ketone: Negative  / Bili: Negative / Urobili: 3 mg/dL   Blood: x / Protein: Negative / Nitrite: Negative   Leuk Esterase: Negative / RBC: x / WBC x   Sq Epi: x / Non Sq Epi: x / Bacteria: x        [All pertinent recent Imaging reports reviewed]         *****A S S E S S M E N T   A N D   P L A N :      Patient is a 60 y/o M PMH DM2, lap band surg 2021 had neg cardiac workup w/ stress + echo 2020 @ [facility] p/w syncope  in AM. Reports feeling groggy upon awakening on , felt lightheaded when standing up, went back to bed. Woke up several hours later, ate breakfast, stood up, felt lightheaded again, unsteady, woke up on the floor. Reports no recent illness, regular PO intake, FS normal at the times of incidents.   Problem/Recommendations 1:  vertigo likely exacerbating orthostatic hypotension   central vs. peripheral vertigo   cta without any occlusion in the head or neck   mri brain to r/o cva   given weight loss of 55 lbs ( lap band)   thiamine iv 500 q 8 h   orthostatic hypotension likely sec diabetic autonomic neuropathy   meclizine 12.5 bid     Problem/Recommendations 2:  back pain   right sided   mri ls pine   lidocaine patch        ___________________________  Will follow with you.  Thank you,  Anahy Monaco MD  Diplomate of the American Board of Neurology and Psychiatry.  Diplomate of the American Board of Vascular Neurology.   [facility] ([facility]), [facility]   Ph: [phone]    Differential diagnosis and plan of care discussed with patient after the evaluation.   Advanced care planning options discussed.   Pain assessed and judicious use of narcotics when appropriate was discussed.  Importance of Fall prevention discussed.  Counseling on Smoking and Alcohol cessation was offered when appropriate.  Counseling on Diet, exercise, and medication compliance was done.   83 minutes spent on the total encounter;  more than 50 % of the visit was spent on counseling  and or coordinating care by the attending physician.    Thank you for allowing me to participate in the care of this zachary patient. Please do not hesitate to call me if you have any questions.     This and subsequent notes  will  inherently be subject to errors including those of syntax and substitutions which may escape proofreading. In such instances original meaning may be extrapolated by contextual derivation.   
EP Attending    HISTORY OF PRESENT ILLNESS:   Patient is a 62 y/o M PMH DM2, lap band surg 1/2021 had neg cardiac workup w/ stress + echo 9/2020 @ [facility] p/w syncope 4/21 in AM. Reports feeling groggy upon awakening on 4/21, felt lightheaded when standing up, went back to bed. Woke up several hours later, ate breakfast, stood up, felt lightheaded again, unsteady, woke up on the floor. Reports no recent illness, regular PO intake, FS normal at the times of incidents. (22 Apr 2021 05:10)    Mr Hahn reports undergoing lap-band surgery ~4-5 months ago, and has done well since with ~50lbs of weight loss.  Often regurgitates solid foods that don't fit thru the band. He reports being happy w/ results and is working his diet around this issue.  He describes multiple episodes of postural collapse with loss of consciousness on the day of admission, starting first-thing in the morning upon waking up, and not improving with eating a few small meals and having further rest in bed.  He describes what initially sounds like a vertiginous component of dysequilibrium, "walking like hes drunk... bouncing from wall to wall unable to maintain a stable direction", but then develops a presyncopal lightheadedness with blurry vision and feeling 'foggy'.  He then recalls gaining consciousness on the floor without recalling falling down.  No significant injuries sustained.  Symptoms worsen with lying-->siting, and sitting-->standing.  He has only transferred from bed-to-wheelchair and vice versa in the ED, with symptoms, and has not walked in the ED for fear of fainting again.  He has no prior episodes of lightheadedness or fainting.    He is not experiencing any palpitations, angina, orthopnea/PND, pleuritic chest pain or leg cramping.  Has chronic low-back-pain that is active at this time.  He does not drink smoke or use drugs.  A 10 pt ROS is otherwise negative.    PAST MEDICAL & SURGICAL HISTORY:  Diabetes    Status post gastric banding      MEDICATIONS  (STANDING):  dextrose 40% Gel 15 Gram(s) Oral once  dextrose 5%. 1000 milliLiter(s) (50 mL/Hr) IV Continuous <Continuous>  dextrose 5%. 1000 milliLiter(s) (100 mL/Hr) IV Continuous <Continuous>  dextrose 50% Injectable 25 Gram(s) IV Push once  dextrose 50% Injectable 12.5 Gram(s) IV Push once  dextrose 50% Injectable 25 Gram(s) IV Push once  glucagon  Injectable 1 milliGRAM(s) IntraMuscular once  insulin lispro (ADMELOG) corrective regimen sliding scale   SubCutaneous three times a day before meals  insulin lispro (ADMELOG) corrective regimen sliding scale   SubCutaneous at bedtime  sodium chloride 0.9%. 1000 milliLiter(s) (100 mL/Hr) IV Continuous <Continuous>    Allergies    No Known Allergies    Intolerances    FAMILY HISTORY:  No pertinent family history in first degree relatives    Non-contributary for premature coronary disease or sudden cardiac death    SOCIAL HISTORY:    [ x] Non-smoker  [ ] Smoker  [ ] Alcohol      PHYSICAL EXAM:  T(C): 36.6 (04-22-21 @ 05:30), Max: 36.9 (04-21-21 @ 16:47)  HR: 87 (04-22-21 @ 05:30) (84 - 107)  BP: 117/68 (04-22-21 @ 05:30) (100/64 - 121/69)  RR: 18 (04-22-21 @ 05:30) (16 - 21)  SpO2: 97% (04-22-21 @ 05:30) (97% - 98%)  Wt(kg): --    General: Well nourished, no acute distress, alert and oriented x 3  Head: normocephalic, no trauma  Neck: no JVD, no bruit, supple, not enlarged  CV: S1S2, no S3, regular rate, rhythm is SINUS, no murmurs.    Lungs: clear BL, no rales or wheezes  Abdomen: bowel sounds +, soft, nontender, nondistended  Extremities: no clubbing, cyanosis or edema  Neuro: Moves all 4 extremities, sensation intact x 4 extremities  Skin: warm and moist, normal turgor  Psych: Mood and affect are appropriate for circumstances  MSK: normal range of motion and strength x4 extremities.    TELEMETRY: NSR.  Orthostatic increase in HR from 82 --> 98bpm, from lying to sitting, reproducible.	    ECG: NSR  Echo: pending  CTA: No dissection, lap-band in horizontal position / ? slippage	  	  LABS:	 	                          16.6   7.81  )-----------( 247      ( 22 Apr 2021 11:20 )             49.1     04-22    136  |  98  |  10  ----------------------------<  142<H>  4.0   |  26  |  0.88    Ca    9.4      22 Apr 2021 11:20  Phos  4.2     04-22  Mg     2.2     04-22    TPro  7.3  /  Alb  4.4  /  TBili  0.6  /  DBili  x   /  AST  21  /  ALT  25  /  AlkPhos  70  04-21    ASSESSMENT/PLAN: 	61y Male subacutely s/p lap-band surgery, presented with syncopal episodes on day of arrival.  Mechanism of syncope is likely orthostatic. He has flat neck veins and no leg edema, and is symptomatic on simple [id number] transition.  No prior CHF history.  Recommend another liter or two of IV crystalloid, as well as to allow PO food/liquid intake while in the ED (he is diabetic on insulin, and with forced reduction in food intake due to restrictive bariatric surgery).  Re-test orthostats after hydration.  Check echocardiogram.  Will follow, and add'l recs based on results of orthostatics and echo.  He is unlikely to spontaneously develop an autonomic dysfunction syndrome like POTS given his demographics.        David Avery M.D.  Cardiac Electrophysiology    office [phone]  pager [phone]

## 2021-04-23 NOTE — DIETITIAN INITIAL EVALUATION ADULT. - PERTINENT LABORATORY DATA
04-23 Na135 mmol/L Glu 109 mg/dL<H> K+ 4.0 mmol/L Cr  0.75 mg/dL BUN 10 mg/dL 04-23 Phos 3.9 mg/dL 04-21 Alb 4.4 g/dL    CAPILLARY BLOOD GLUCOSE  POCT Blood Glucose.: 129 mg/dL (23 Apr 2021 11:55)  POCT Blood Glucose.: 99 mg/dL (23 Apr 2021 07:38)  POCT Blood Glucose.: 119 mg/dL (22 Apr 2021 21:25)  POCT Blood Glucose.: 106 mg/dL (22 Apr 2021 17:26)    A1C with Estimated Average Glucose (04.23.21 @ 06:20)    A1C with Estimated Average Glucose Result: 5.9: High Risk (prediabetic)    5.7 - 6.4 %  Diabetic, diagnostic           > 6.5 %  ADA diabetic treatment goal    < 7.0 %  HbA1C values may not accurately reflect mean blood glucose in patients  with Hb variants.  Suggest clinical correlation. %    Estimated Average Glucose: 123 mg/dL

## 2021-04-23 NOTE — PROGRESS NOTE ADULT - SUBJECTIVE AND OBJECTIVE BOX
chief complaint: syncope     extended hpi: 62 y/o M PMH DM2 x 10 years, Diabetic neuropathy in L foot, s/p lap band surg 1/2021,  reports neg cardiac workup w/ stress + echo 9/2020 @ [facility] admitted with syncope.     S: no chest pain or sob; ros otherwise negative.     Review of Systems:   Constitutional: [ ] fevers, [ ] chills.   Skin: [ ] dry skin. [ ] rashes.  Psychiatric: [ ] depression, [ ] anxiety.   Gastrointestinal: [ ] BRBPR, [ ] melena.   Neurological: [ ] confusion. [ ] seizures. [ ] shuffling gait.   Ears,Nose,Mouth and Throat: [ ] ear pain [ ] sore throat.   Eyes: [ ] diplopia.   Respiratory: [ ] hemoptysis. [ ] shortness of breath  Cardiovascular: See HPI above  Hematologic/Lymphatic: [ ] anemia. [ ] painful nodes. [ ] prolonged bleeding.   Genitourinary: [ ] hematuria. [ ] flank pain.   Endocrine: [ ] significant change in weight. [ ] intolerance to heat and cold.     Review of systems [x] otherwise negative, [ ] otherwise unable to obtain    FH: no family history of sudden cardiac death in first degree relatives    SH: [ ] tobacco, [ ] alcohol, [ ] drugs    dextrose 40% Gel 15 Gram(s) Oral once  dextrose 5%. 1000 milliLiter(s) IV Continuous <Continuous>  dextrose 5%. 1000 milliLiter(s) IV Continuous <Continuous>  dextrose 50% Injectable 25 Gram(s) IV Push once  dextrose 50% Injectable 12.5 Gram(s) IV Push once  dextrose 50% Injectable 25 Gram(s) IV Push once  glucagon  Injectable 1 milliGRAM(s) IntraMuscular once  insulin lispro (ADMELOG) corrective regimen sliding scale   SubCutaneous three times a day before meals  insulin lispro (ADMELOG) corrective regimen sliding scale   SubCutaneous at bedtime  sodium chloride 0.9%. 1000 milliLiter(s) IV Continuous <Continuous>                            16.6   7.81  )-----------( 247      ( 22 Apr 2021 11:20 )             49.1       04-23    135  |  103  |  10  ----------------------------<  109<H>  4.0   |  23  |  0.75    Ca    8.9      23 Apr 2021 06:20  Phos  3.9     04-23  Mg     2.3     04-23    TPro  7.3  /  Alb  4.4  /  TBili  0.6  /  DBili  x   /  AST  21  /  ALT  25  /  AlkPhos  70  04-21            T(C): 36.8 (04-23-21 @ 12:11), Max: 36.8 (04-23-21 @ 12:11)  HR: 81 (04-23-21 @ 12:11) (78 - 85)  BP: 102/65 (04-23-21 @ 12:11) (102/65 - 127/71)  RR: 17 (04-23-21 @ 12:11) (17 - 19)  SpO2: 96% (04-23-21 @ 12:11) (95% - 99%)  Wt(kg): --    I&O's Summary      General: Well nourished in no acute distress. Alert and Oriented * 3.   Head: Normocephalic and atraumatic.   Neck: No JVD. No bruits. Supple. Does not appear to be enlarged.   Cardiovascular: + S1,S2 ; RRR Soft systolic murmur at the left lower sternal border. No rubs noted.    Lungs: CTA b/l. No rhonchi, rales or wheezes.   Abdomen: + BS, soft. Non tender. Non distended. No rebound. No guarding.   Extremities: No clubbing/cyanosis/edema.   Neurologic: Moves all four extremities. Full range of motion.   Skin: Warm and moist. The patient's skin has normal elasticity and good skin turgor.   Psychiatric: Appropriate mood and affect.  Musculoskeletal: Normal range of motion, normal strength    Tele: SR     A/P: 62 y/o M PMH DM2 x 10 years, Diabetic neuropathy in L foot, s/p lap band surg 1/2021,  reports neg cardiac workup w/ stress + echo 9/2020 @  [facility] admitted with syncope.     -pt. found to be orthostatic  -orthostatics have improved after IVF however still positive  -TEDS stockings  -check TTE  -monitor tele  -neuro eval with Dr. Monaco called to evaluate for autonomic dysfunction  -further workup pending above    Dilan Rolon MD

## 2021-04-24 ENCOUNTER — TRANSCRIPTION ENCOUNTER (OUTPATIENT)
Age: 61
End: 2021-04-24

## 2021-04-24 LAB
ANION GAP SERPL CALC-SCNC: 12 MMOL/L — SIGNIFICANT CHANGE UP (ref 7–14)
BUN SERPL-MCNC: 12 MG/DL — SIGNIFICANT CHANGE UP (ref 7–23)
CALCIUM SERPL-MCNC: 9.1 MG/DL — SIGNIFICANT CHANGE UP (ref 8.4–10.5)
CHLORIDE SERPL-SCNC: 102 MMOL/L — SIGNIFICANT CHANGE UP (ref 98–107)
CO2 SERPL-SCNC: 24 MMOL/L — SIGNIFICANT CHANGE UP (ref 22–31)
CREAT SERPL-MCNC: 0.76 MG/DL — SIGNIFICANT CHANGE UP (ref 0.5–1.3)
GLUCOSE BLDC GLUCOMTR-MCNC: 101 MG/DL — HIGH (ref 70–99)
GLUCOSE BLDC GLUCOMTR-MCNC: 109 MG/DL — HIGH (ref 70–99)
GLUCOSE BLDC GLUCOMTR-MCNC: 120 MG/DL — HIGH (ref 70–99)
GLUCOSE BLDC GLUCOMTR-MCNC: 170 MG/DL — HIGH (ref 70–99)
GLUCOSE SERPL-MCNC: 111 MG/DL — HIGH (ref 70–99)
HCT VFR BLD CALC: 47.9 % — SIGNIFICANT CHANGE UP (ref 39–50)
HGB BLD-MCNC: 16.7 G/DL — SIGNIFICANT CHANGE UP (ref 13–17)
MAGNESIUM SERPL-MCNC: 2.4 MG/DL — SIGNIFICANT CHANGE UP (ref 1.6–2.6)
MCHC RBC-ENTMCNC: 33 PG — SIGNIFICANT CHANGE UP (ref 27–34)
MCHC RBC-ENTMCNC: 34.9 GM/DL — SIGNIFICANT CHANGE UP (ref 32–36)
MCV RBC AUTO: 94.7 FL — SIGNIFICANT CHANGE UP (ref 80–100)
NRBC # BLD: 0 /100 WBCS — SIGNIFICANT CHANGE UP
NRBC # FLD: 0 K/UL — SIGNIFICANT CHANGE UP
PHOSPHATE SERPL-MCNC: 4.3 MG/DL — SIGNIFICANT CHANGE UP (ref 2.5–4.5)
PLATELET # BLD AUTO: 209 K/UL — SIGNIFICANT CHANGE UP (ref 150–400)
POTASSIUM SERPL-MCNC: 3.7 MMOL/L — SIGNIFICANT CHANGE UP (ref 3.5–5.3)
POTASSIUM SERPL-SCNC: 3.7 MMOL/L — SIGNIFICANT CHANGE UP (ref 3.5–5.3)
RBC # BLD: 5.06 M/UL — SIGNIFICANT CHANGE UP (ref 4.2–5.8)
RBC # FLD: 12.2 % — SIGNIFICANT CHANGE UP (ref 10.3–14.5)
SODIUM SERPL-SCNC: 138 MMOL/L — SIGNIFICANT CHANGE UP (ref 135–145)
WBC # BLD: 6.81 K/UL — SIGNIFICANT CHANGE UP (ref 3.8–10.5)
WBC # FLD AUTO: 6.81 K/UL — SIGNIFICANT CHANGE UP (ref 3.8–10.5)

## 2021-04-24 PROCEDURE — 70551 MRI BRAIN STEM W/O DYE: CPT | Mod: 26

## 2021-04-24 PROCEDURE — 93306 TTE W/DOPPLER COMPLETE: CPT | Mod: 26

## 2021-04-24 PROCEDURE — 72148 MRI LUMBAR SPINE W/O DYE: CPT | Mod: 26

## 2021-04-24 RX ORDER — GABAPENTIN 400 MG/1
100 CAPSULE ORAL
Refills: 0 | Status: DISCONTINUED | OUTPATIENT
Start: 2021-04-24 | End: 2021-04-25

## 2021-04-24 RX ADMIN — Medication 12.5 MILLIGRAM(S): at 22:46

## 2021-04-24 RX ADMIN — LIDOCAINE 1 PATCH: 4 CREAM TOPICAL at 20:00

## 2021-04-24 RX ADMIN — GABAPENTIN 100 MILLIGRAM(S): 400 CAPSULE ORAL at 17:56

## 2021-04-24 RX ADMIN — LIDOCAINE 1 PATCH: 4 CREAM TOPICAL at 07:07

## 2021-04-24 RX ADMIN — Medication 12.5 MILLIGRAM(S): at 13:20

## 2021-04-24 RX ADMIN — LIDOCAINE 1 PATCH: 4 CREAM TOPICAL at 09:00

## 2021-04-24 RX ADMIN — LIDOCAINE 1 PATCH: 4 CREAM TOPICAL at 13:19

## 2021-04-24 RX ADMIN — Medication 105 MILLIGRAM(S): at 17:56

## 2021-04-24 NOTE — PROGRESS NOTE ADULT - SUBJECTIVE AND OBJECTIVE BOX
EP     HISTORY OF PRESENT ILLNESS:   Patient is a 62 y/o M PMH DM2, lap band surg 1/2021 had neg cardiac workup w/ stress + echo 9/2020 @ [facility] p/w syncope 4/21 in AM. Reports feeling groggy upon awakening on 4/21, felt lightheaded when standing up, went back to bed. Woke up several hours later, ate breakfast, stood up, felt lightheaded again, unsteady, woke up on the floor. Reports no recent illness, regular PO intake, FS normal at the times of incidents. (22 Apr 2021 05:10)    Mr Hahn reports undergoing lap-band surgery ~4-5 months ago, and has done well since with ~50lbs of weight loss.  Often regurgitates solid foods that don't fit thru the band. He reports being happy w/ results and is working his diet around this issue.  He describes multiple episodes of postural collapse with loss of consciousness on the day of admission, starting first-thing in the morning upon waking up, and not improving with eating a few small meals and having further rest in bed.  He describes what initially sounds like a vertiginous component of dysequilibrium, "walking like hes drunk... bouncing from wall to wall unable to maintain a stable direction", but then develops a presyncopal lightheadedness with blurry vision and feeling 'foggy'.  He then recalls gaining consciousness on the floor without recalling falling down.  No significant injuries sustained.  Symptoms worsen with lying-->siting, and sitting-->standing.  He has only transferred from bed-to-wheelchair and vice versa in the ED, with symptoms, and has not walked in the ED for fear of fainting again.  He has no prior episodes of lightheadedness or fainting.    He is not experiencing any palpitations, angina, orthopnea/PND, pleuritic chest pain or leg cramping.  Has chronic low-back-pain that is active at this time.  He does not drink smoke or use drugs.  A 10 pt ROS is otherwise negative.    4/23- feeling a bit better today. able to ambulate in room with some lightheaded "head-buzzing" sensation but no falls or near-falls.  no headache.  no palpitations.  remarks that orthostatic vitals were performed and he still has large increases in HR from sitting to standing (20+bpm)    4/24 no events overnight         dextrose 40% Gel 15 Gram(s) Oral once  dextrose 5%. 1000 milliLiter(s) IV Continuous <Continuous>  dextrose 5%. 1000 milliLiter(s) IV Continuous <Continuous>  dextrose 50% Injectable 25 Gram(s) IV Push once  dextrose 50% Injectable 12.5 Gram(s) IV Push once  dextrose 50% Injectable 25 Gram(s) IV Push once  glucagon  Injectable 1 milliGRAM(s) IntraMuscular once  insulin lispro (ADMELOG) corrective regimen sliding scale   SubCutaneous three times a day before meals  insulin lispro (ADMELOG) corrective regimen sliding scale   SubCutaneous at bedtime  lidocaine   Patch 1 Patch Transdermal daily  meclizine 12.5 milliGRAM(s) Oral two times a day  sodium chloride 0.9%. 1000 milliLiter(s) IV Continuous <Continuous>  thiamine IVPB 500 milliGRAM(s) IV Intermittent three times a day                            16.7   6.81  )-----------( 209      ( 24 Apr 2021 07:10 )             47.9       Hemoglobin: 16.7 g/dL (04-24 @ 07:10)  Hemoglobin: 16.6 g/dL (04-22 @ 11:20)  Hemoglobin: 17.0 g/dL (04-21 @ 18:14)      04-24    138  |  102  |  12  ----------------------------<  111<H>  3.7   |  24  |  0.76    Ca    9.1      24 Apr 2021 07:10  Phos  4.3     04-24  Mg     2.4     04-24      Creatinine Trend: 0.76<--, 0.75<--, 0.88<--, 0.86<--    COAGS:           T(C): 36.9 (04-24-21 @ 06:49), Max: 36.9 (04-24-21 @ 06:49)  HR: 78 (04-24-21 @ 06:49) (78 - 81)  BP: 98/55 (04-24-21 @ 06:49) (98/55 - 123/78)  RR: 17 (04-24-21 @ 06:49) (17 - 17)  SpO2: 98% (04-24-21 @ 06:49) (96% - 98%)  Wt(kg): --    I&O's Summary     General: Well nourished, no acute distress, alert and oriented x 3  Head: normocephalic, no trauma  Neck: no JVD, no bruit, supple, not enlarged  CV: S1S2, no S3, regular rate, rhythm is SINUS, no murmurs.    Lungs: clear BL, no rales or wheezes  Abdomen: bowel sounds +, soft, nontender, nondistended  Extremities: no clubbing, cyanosis or edema  Neuro: Moves all 4 extremities, sensation intact x 4 extremities  Skin: warm and moist, normal turgor  Psych: Mood and affect are appropriate for circumstances  MSK: normal range of motion and strength x4 extremities.    TELEMETRY: NSR.  Orthostatic increase in HR from 82 --> 98bpm, from lying to sitting, reproducible.	    ECG: NSR  Echo: pending  CTA: No dissection, lap-band in horizontal position / ? slippage	      ASSESSMENT/PLAN: 	61y Male subacutely s/p lap-band surgery, presented with syncopal episodes on day of arrival.      Mechanism of syncope is consistent with orthostatics    He has flat neck veins and no leg edema.  Is less symptomatic sitting up but still with some mild symptoms during standing.  ECHO pending   ? of autonomic dysfunction, given history of diabetes  Neuro follow up

## 2021-04-24 NOTE — DISCHARGE NOTE PROVIDER - HOSPITAL COURSE
Mr Selma is a 61 M PMH DM2, lap band surg 1/2021 had neg cardiac workup w/ stress + echo 9/2020 @ [facility] p/w syncope    Syncope, unspecified syncope type.    Plan: monitor on telemetry, check orthostatics, f/u cards in AM.   -CT C/A/P No aortic aneurysm, dissection or intramural hematoma. Gastric lap band is horizontal in orientation, which may indicate slippage.  -CT Head: No acute intracranial hemorrhage or mass effect., CTA Head: No major vessel occlusion, significant stenosis, dissection, or saccular aneurysm.  CTA Neck: No hemodynamically significant stenosis by NASCET, dissection, or pseudoaneurysm.  -Cardiology following  -Orthostatic +  -orthostatics have improved after IVF however still positive  -TEDS stockings  -TTE with normal LV function   -monitor tele  -neuro eval appreciated   -thiamine iv 500 q 8 h   -orthostatic hypotension likely sec diabetic autonomic neuropathy   meclizine 12.5 bid   -continue to monitor orthostatic BP   MRI with small central and right sided disc at l4/5 that is causing mild thecal sac compression   -gabapentin 100 bid started ( if pt tolerates it)   -lidocaine patch  -Neuro recommends conservative management with outpt physical therapy and medications, can get surgical opinion, and f/u with surgery after trying physical therapy and medications.  FU PT eval     Diabetes mellitus type 2, noninsulin dependent.    Plan: FS AC/QHS w/ sliding scale.     Gastric band slippage.    Plan: possible slippage on imaging, no signs/symptoms of obstruction, will need Sx f/u.     Dispo: On ___ this case was reviewed with  ____, the patient is medically stable and optimized for discharge. All medications were reviewed and prescriptions were sent to mutually agreed upon pharmacy.     Mr Selma is a 61 M PMH DM2, lap band surg 1/2021 had neg cardiac workup w/ stress + echo 9/2020 @ [facility] p/w syncope    Syncope, unspecified syncope type.    Plan: monitor on telemetry, check orthostatics, f/u cards in AM.   -CT C/A/P No aortic aneurysm, dissection or intramural hematoma. Gastric lap band is horizontal in orientation, which may indicate slippage.  -CT Head: No acute intracranial hemorrhage or mass effect., CTA Head: No major vessel occlusion, significant stenosis, dissection, or saccular aneurysm.  CTA Neck: No hemodynamically significant stenosis by NASCET, dissection, or pseudoaneurysm.  -Cardiology following  -Orthostatic +  -orthostatics have improved after IVF however still positive  -TEDS stockings  -midodrine started  -TTE with normal LV function   -monitor tele  -neuro eval appreciated   -thiamine iv 500 q 8 h   -orthostatic hypotension likely sec diabetic autonomic neuropathy   meclizine 12.5 bid   -continue to monitor orthostatic BP   MRI with small central and right sided disc at l4/5 that is causing mild thecal sac compression   -gabapentin 100 bid started ( if pt tolerates it)   -lidocaine patch  -Neuro recommends conservative management with outpt physical therapy and medications, can get surgical opinion, and f/u with surgery after trying physical therapy and medications.     Diabetes mellitus type 2, noninsulin dependent.    Plan: FS AC/QHS w/ sliding scale.     Gastric band slippage.    Plan: possible slippage on imaging, no signs/symptoms of obstruction, will need Sx f/u.     Dispo: On ___ this case was reviewed with  ____, the patient is medically stable and optimized for discharge. All medications were reviewed and prescriptions were sent to mutually agreed upon pharmacy.     Mr Selma is a 61 M PMH DM2, lap band surg 1/2021 had neg cardiac workup w/ stress + echo 9/2020 @ [facility] p/w syncope    Syncope, unspecified syncope type.    Plan: monitor on telemetry, check orthostatics, f/u cards in AM.   -CT C/A/P No aortic aneurysm, dissection or intramural hematoma. Gastric lap band is horizontal in orientation, which may indicate slippage.  -CT Head: No acute intracranial hemorrhage or mass effect., CTA Head: No major vessel occlusion, significant stenosis, dissection, or saccular aneurysm.  CTA Neck: No hemodynamically significant stenosis by NASCET, dissection, or pseudoaneurysm.  -Cardiology following  -Orthostatic + upon arrival  -orthostatics have improved after IVF   -TEDS stockings  -midodrine started  -TTE with normal LV function   -monitor tele  -neuro eval appreciated   -thiamine iv 500 q 8 h   -orthostatic hypotension likely sec diabetic autonomic neuropathy   meclizine 12.5 bid   -continue to monitor orthostatic BP   MRI with small central and right sided disc at l4/5 that is causing mild thecal sac compression   -gabapentin 100 bid started ( if pt tolerates it)   -lidocaine patch  -Neuro recommends conservative management with outpt physical therapy and medications, can get surgical opinion, and f/u with surgery after trying physical therapy and medications.     Diabetes mellitus type 2, noninsulin dependent.    Plan: FS AC/QHS w/ sliding scale.     Gastric band slippage.    Plan: possible slippage on imaging, no signs/symptoms of obstruction, will need Sx f/u.     Dispo: On 4/27/21 this case was reviewed with Dr. Caceres, the patient is medically stable and optimized for discharge. All medications were reviewed and prescriptions were sent to mutually agreed upon pharmacy.

## 2021-04-24 NOTE — PROGRESS NOTE ADULT - SUBJECTIVE AND OBJECTIVE BOX
Patient is a 61y old  Male who presents with a chief complaint of syncope (24 Apr 2021 20:12)      INTERVAL HPI/OVERNIGHT EVENTS: noted  pt seen and examined this am   events noted  no cp/sob  +dizziness      Vital Signs Last 24 Hrs  T(C): 36.9 (24 Apr 2021 12:25), Max: 36.9 (24 Apr 2021 06:49)  T(F): 98.4 (24 Apr 2021 12:25), Max: 98.4 (24 Apr 2021 06:49)  HR: 74 (24 Apr 2021 12:25) (74 - 90)  BP: 103/61 (24 Apr 2021 12:25) (98/55 - 111/64)  BP(mean): --  RR: 18 (24 Apr 2021 12:25) (17 - 18)  SpO2: 98% (24 Apr 2021 12:25) (98% - 99%)    dextrose 40% Gel 15 Gram(s) Oral once  dextrose 5%. 1000 milliLiter(s) IV Continuous <Continuous>  dextrose 5%. 1000 milliLiter(s) IV Continuous <Continuous>  dextrose 50% Injectable 25 Gram(s) IV Push once  dextrose 50% Injectable 12.5 Gram(s) IV Push once  dextrose 50% Injectable 25 Gram(s) IV Push once  gabapentin 100 milliGRAM(s) Oral two times a day  glucagon  Injectable 1 milliGRAM(s) IntraMuscular once  insulin lispro (ADMELOG) corrective regimen sliding scale   SubCutaneous three times a day before meals  insulin lispro (ADMELOG) corrective regimen sliding scale   SubCutaneous at bedtime  lidocaine   Patch 1 Patch Transdermal daily  meclizine 12.5 milliGRAM(s) Oral two times a day  sodium chloride 0.9%. 1000 milliLiter(s) IV Continuous <Continuous>      PHYSICAL EXAM:  GENERAL: NAD,   EYES: conjunctiva and sclera clear  ENMT: Moist mucous membranes  NECK: Supple, No JVD, Normal thyroid  CHEST/LUNG: non labored, cta b/l  HEART: Regular rate and rhythm; No murmurs, rubs, or gallops  ABDOMEN: Soft, Nontender, Nondistended; Bowel sounds present  EXTREMITIES:  2+ Peripheral Pulses, No clubbing, cyanosis, or edema  LYMPH: No lymphadenopathy noted  SKIN: No rashes or lesions    Consultant(s) Notes Reviewed:  [x ] YES  [ ] NO  Care Discussed with Consultants/Other Providers [ x] YES  [ ] NO    LABS:                        16.7   6.81  )-----------( 209      ( 24 Apr 2021 07:10 )             47.9     04-24    138  |  102  |  12  ----------------------------<  111<H>  3.7   |  24  |  0.76    Ca    9.1      24 Apr 2021 07:10  Phos  4.3     04-24  Mg     2.4     04-24          CAPILLARY BLOOD GLUCOSE      POCT Blood Glucose.: 170 mg/dL (24 Apr 2021 21:13)  POCT Blood Glucose.: 109 mg/dL (24 Apr 2021 16:36)  POCT Blood Glucose.: 120 mg/dL (24 Apr 2021 12:07)  POCT Blood Glucose.: 101 mg/dL (24 Apr 2021 07:45)              RADIOLOGY & ADDITIONAL TESTS:    Imaging Personally Reviewed:  [x ] YES  [ ] NO

## 2021-04-24 NOTE — PROGRESS NOTE ADULT - SUBJECTIVE AND OBJECTIVE BOX
chief complaint: syncope     extended hpi: 62 y/o M PMH DM2 x 10 years, Diabetic neuropathy in L foot, s/p lap band surg 1/2021,  reports neg cardiac workup w/ stress + echo 9/2020 @ [facility] admitted with syncope.     S: no chest pain or sob; ros otherwise negative.     Review of Systems:   Constitutional: [ ] fevers, [ ] chills.   Skin: [ ] dry skin. [ ] rashes.  Psychiatric: [ ] depression, [ ] anxiety.   Gastrointestinal: [ ] BRBPR, [ ] melena.   Neurological: [ ] confusion. [ ] seizures. [ ] shuffling gait.   Ears,Nose,Mouth and Throat: [ ] ear pain [ ] sore throat.   Eyes: [ ] diplopia.   Respiratory: [ ] hemoptysis. [ ] shortness of breath  Cardiovascular: See HPI above  Hematologic/Lymphatic: [ ] anemia. [ ] painful nodes. [ ] prolonged bleeding.   Genitourinary: [ ] hematuria. [ ] flank pain.   Endocrine: [ ] significant change in weight. [ ] intolerance to heat and cold.     Review of systems [x] otherwise negative, [ ] otherwise unable to obtain    FH: no family history of sudden cardiac death in first degree relatives    SH: [ ] tobacco, [ ] alcohol, [ ] drugs    dextrose 40% Gel 15 Gram(s) Oral once  dextrose 5%. 1000 milliLiter(s) IV Continuous <Continuous>  dextrose 5%. 1000 milliLiter(s) IV Continuous <Continuous>  dextrose 50% Injectable 25 Gram(s) IV Push once  dextrose 50% Injectable 12.5 Gram(s) IV Push once  dextrose 50% Injectable 25 Gram(s) IV Push once  gabapentin 100 milliGRAM(s) Oral two times a day  glucagon  Injectable 1 milliGRAM(s) IntraMuscular once  insulin lispro (ADMELOG) corrective regimen sliding scale   SubCutaneous three times a day before meals  insulin lispro (ADMELOG) corrective regimen sliding scale   SubCutaneous at bedtime  lidocaine   Patch 1 Patch Transdermal daily  meclizine 12.5 milliGRAM(s) Oral two times a day  sodium chloride 0.9%. 1000 milliLiter(s) IV Continuous <Continuous>                            16.7   6.81  )-----------( 209      ( 24 Apr 2021 07:10 )             47.9       04-24    138  |  102  |  12  ----------------------------<  111<H>  3.7   |  24  |  0.76    Ca    9.1      24 Apr 2021 07:10  Phos  4.3     04-24  Mg     2.4     04-24              T(C): 36.9 (04-24-21 @ 12:25), Max: 36.9 (04-24-21 @ 06:49)  HR: 74 (04-24-21 @ 12:25) (74 - 90)  BP: 103/61 (04-24-21 @ 12:25) (98/55 - 123/78)  RR: 18 (04-24-21 @ 12:25) (17 - 18)  SpO2: 98% (04-24-21 @ 12:25) (97% - 99%)  Wt(kg): --    I&O's Summary      General: Well nourished in no acute distress. Alert and Oriented * 3.   Head: Normocephalic and atraumatic.   Neck: No JVD. No bruits. Supple. Does not appear to be enlarged.   Cardiovascular: + S1,S2 ; RRR Soft systolic murmur at the left lower sternal border. No rubs noted.    Lungs: CTA b/l. No rhonchi, rales or wheezes.   Abdomen: + BS, soft. Non tender. Non distended. No rebound. No guarding.   Extremities: No clubbing/cyanosis/edema.   Neurologic: Moves all four extremities. Full range of motion.   Skin: Warm and moist. The patient's skin has normal elasticity and good skin turgor.   Psychiatric: Appropriate mood and affect.  Musculoskeletal: Normal range of motion, normal strength    Tele: SR     A/P: 62 y/o M PMH DM2 x 10 years, Diabetic neuropathy in L foot, s/p lap band surg 1/2021,  reports neg cardiac workup w/ stress + echo 9/2020 @ [facility] admitted with syncope.     -pt. found to be orthostatic  -orthostatics have improved after IVF however still positive  -TEDS stockings  -TTE with normal LV function   -monitor tele  -neuro eval appreciated   -check orthostatics     Dilan Rolon MD

## 2021-04-24 NOTE — DISCHARGE NOTE PROVIDER - NSDCCPCAREPLAN_GEN_ALL_CORE_FT
PRINCIPAL DISCHARGE DIAGNOSIS  Diagnosis: Syncope  Assessment and Plan of Treatment: You were evaluated by cardiology, and had an echo with preserved cardiac function. You had CT scan of Head , chest , abdomen, and pelvis without any occlusive , or urgent findings. You were evaluated by Neurology , and had an MRI that revealed small central and right sided disc at l4/5 that is causing mild thecal sac compression Dr Monaco the neurologist has recommended for you to start conservative medical management with  medication regimen , along with outpatient Physical Therapy. If symptoms do not improve then will seek further medical/surgical recommendations from outpatient Primary Care Physician, and Neurologist        SECONDARY DISCHARGE DIAGNOSES  Diagnosis: Gastric band slippage  Assessment and Plan of Treatment: You had a CT scan of the abdomen, the radiologist said your band may have slipped out of position. You do not have any obstruction, or any other unusual symptoms such as nausea, vomiting, or severe gastrointestinal pain. Please follow up with your Bariatric surgeon in 1 week following discharge for furthermedicalmanagement.    Diagnosis: Diabetes mellitus type 2, noninsulin dependent  Assessment and Plan of Treatment: Continue your medication regimen and a consistent carbohydrate diet (Meaning eating the same amount of carbohydrates at the same time each day). Monitor blood glucose levels throughout the day before meals and at bedtime. Record blood sugars and bring to outpatient providers appointment in order to be reviewed by your doctor for management modifications. If your sugars are more than 400 or less than 70 you should contact your PCP immediately. Monitor for signs/symptoms of low blood glucose, such as, dizziness, altered mental status, or cool/clammy skin. In addition, monitor for signs/symptoms of high blood glucose, such as, feeling hot, dry, fatigued, or with increased thirst/urination. Make regular podiatry appointments in order to have feet checked for wounds and uncontrolled toe nail growth to prevent infections, as well as, appointments with an ophthalmologist to monitor your vision.       PRINCIPAL DISCHARGE DIAGNOSIS  Diagnosis: Syncope  Assessment and Plan of Treatment: You were evaluated by cardiology, and had an echo with preserved cardiac function. Please follow up with your outpatient cardiologist. You had CT scan of Head , chest , abdomen, and pelvis without any occlusive , or urgent findings. You were evaluated by Neurology , and had an MRI that revealed small central and right sided disc at l4/5 that is causing mild thecal sac compression Dr Monaco the neurologist has recommended for you to start conservative medical management with gabapentin , along with outpatient Physical Therapy. If symptoms do not improve see your PCP for further management recommendations or can follow up with a neurologist        SECONDARY DISCHARGE DIAGNOSES  Diagnosis: Diabetes mellitus type 2, noninsulin dependent  Assessment and Plan of Treatment: Continue your medication regimen and a consistent carbohydrate diet (Meaning eating the same amount of carbohydrates at the same time each day). Monitor blood glucose levels throughout the day before meals and at bedtime. Record blood sugars and bring to outpatient providers appointment in order to be reviewed by your doctor for management modifications. If your sugars are more than 400 or less than 70 you should contact your PCP immediately. Monitor for signs/symptoms of low blood glucose, such as, dizziness, altered mental status, or cool/clammy skin. In addition, monitor for signs/symptoms of high blood glucose, such as, feeling hot, dry, fatigued, or with increased thirst/urination. Make regular podiatry appointments in order to have feet checked for wounds and uncontrolled toe nail growth to prevent infections, as well as, appointments with an ophthalmologist to monitor your vision.      Diagnosis: Gastric band slippage  Assessment and Plan of Treatment: You had a CT scan of the abdomen, the radiologist said your band may have slipped out of position. You do not have any obstruction, or any other unusual symptoms such as nausea, vomiting, or severe gastrointestinal pain. Please follow up with your Bariatric surgeon in 1 week following discharge for furthermedicalmanagement.     PRINCIPAL DISCHARGE DIAGNOSIS  Diagnosis: Syncope  Assessment and Plan of Treatment: You were evaluated by cardiology, and had an echo with preserved cardiac function. Please follow up with your outpatient cardiologist. You had CT scan of Head , chest , abdomen, and pelvis without any occlusive , or urgent findings. You were evaluated by Neurology , and had an MRI that revealed small central and right sided disc at l4/5 that is causing mild thecal sac compression Dr Monaco the neurologist has recommended for you to start conservative medical management with gabapentin , along with outpatient Physical Therapy. If symptoms do not improve see your PCP for further management recommendations or can follow up with a neurologist        SECONDARY DISCHARGE DIAGNOSES  Diagnosis: Diabetes mellitus type 2, noninsulin dependent  Assessment and Plan of Treatment: Continue your medication regimen and a consistent carbohydrate diet (Meaning eating the same amount of carbohydrates at the same time each day). Monitor blood glucose levels throughout the day before meals and at bedtime. Record blood sugars and bring to outpatient providers appointment in order to be reviewed by your doctor for management modifications. If your sugars are more than 400 or less than 70 you should contact your PCP immediately. Monitor for signs/symptoms of low blood glucose, such as, dizziness, altered mental status, or cool/clammy skin. In addition, monitor for signs/symptoms of high blood glucose, such as, feeling hot, dry, fatigued, or with increased thirst/urination. Make regular podiatry appointments in order to have feet checked for wounds and uncontrolled toe nail growth to prevent infections, as well as, appointments with an ophthalmologist to monitor your vision.      Diagnosis: Gastric band slippage  Assessment and Plan of Treatment: You had a CT scan of the abdomen, the radiologist said your band may have slipped out of position. You do not have any obstruction, or any other unusual symptoms such as nausea, vomiting, or severe gastrointestinal pain. Please follow up with your Bariatric surgeon in 1 week following discharge for furthermedicalmanagement.    Diagnosis: Orthostatic hypotension  Assessment and Plan of Treatment: Take your time transitioning from lying to sitting to standing. If you feel dizzy, lay down and call for help. COntinue Midodrine for blood pressure support as prescribed and follow up with PCP and outpatient cardiologist in 1 week for BP check and further management

## 2021-04-24 NOTE — DISCHARGE NOTE PROVIDER - NSDCMRMEDTOKEN_GEN_ALL_CORE_FT
Flexeril:   Jardiance:   metFORMIN:    Flexeril:   Jardiance:   metFORMIN:   outpatient Vestibular Rehab:    gabapentin 100 mg oral capsule: 1 cap(s) orally once a day  gabapentin 100 mg oral capsule: 2 cap(s) orally once a day (at bedtime)  Jardiance:   metFORMIN:   midodrine 5 mg oral tablet: 1 tab(s) orally 3 times a day

## 2021-04-24 NOTE — DISCHARGE NOTE PROVIDER - CARE PROVIDER_API CALL
Anahy Monaco  NEUROLOGY  [address] [place] [address]  Phone: [phone]  Fax: [phone]  Established Patient  Follow Up Time: 2 weeks   Anahy Monaco  NEUROLOGY  [address] [place] [address]  Phone: [phone]  Fax: [phone]  Established Patient  Follow Up Time: 2 weeks    Dilan Rolon (MD)  Cardiovascular Disease; Internal Medicine; Interventional Cardiology  [address] [place] [address]  Phone: [phone]  Fax: [phone]  Follow Up Time:     Shukri Borges  INTERNAL MEDICINE  [address] [place] [address]  Phone: [phone]  Fax: [phone]  Follow Up Time:

## 2021-04-24 NOTE — DISCHARGE NOTE PROVIDER - PROVIDER TOKENS
PROVIDER:[TOKEN:[[id number]],FOLLOWUP:[2 weeks],ESTABLISHEDPATIENT:[T]] PROVIDER:[TOKEN:[[id number]],FOLLOWUP:[2 weeks],ESTABLISHEDPATIENT:[T]],PROVIDER:[TOKEN:[[id number]]],PROVIDER:[TOKEN:[[id number]]]

## 2021-04-25 LAB
ANION GAP SERPL CALC-SCNC: 9 MMOL/L — SIGNIFICANT CHANGE UP (ref 7–14)
BUN SERPL-MCNC: 9 MG/DL — SIGNIFICANT CHANGE UP (ref 7–23)
CALCIUM SERPL-MCNC: 9.1 MG/DL — SIGNIFICANT CHANGE UP (ref 8.4–10.5)
CHLORIDE SERPL-SCNC: 101 MMOL/L — SIGNIFICANT CHANGE UP (ref 98–107)
CO2 SERPL-SCNC: 24 MMOL/L — SIGNIFICANT CHANGE UP (ref 22–31)
CREAT SERPL-MCNC: 0.8 MG/DL — SIGNIFICANT CHANGE UP (ref 0.5–1.3)
GLUCOSE BLDC GLUCOMTR-MCNC: 110 MG/DL — HIGH (ref 70–99)
GLUCOSE BLDC GLUCOMTR-MCNC: 117 MG/DL — HIGH (ref 70–99)
GLUCOSE BLDC GLUCOMTR-MCNC: 134 MG/DL — HIGH (ref 70–99)
GLUCOSE BLDC GLUCOMTR-MCNC: 87 MG/DL — SIGNIFICANT CHANGE UP (ref 70–99)
GLUCOSE SERPL-MCNC: 99 MG/DL — SIGNIFICANT CHANGE UP (ref 70–99)
HCT VFR BLD CALC: 48.5 % — SIGNIFICANT CHANGE UP (ref 39–50)
HGB BLD-MCNC: 16.5 G/DL — SIGNIFICANT CHANGE UP (ref 13–17)
MAGNESIUM SERPL-MCNC: 2.3 MG/DL — SIGNIFICANT CHANGE UP (ref 1.6–2.6)
MCHC RBC-ENTMCNC: 32.9 PG — SIGNIFICANT CHANGE UP (ref 27–34)
MCHC RBC-ENTMCNC: 34 GM/DL — SIGNIFICANT CHANGE UP (ref 32–36)
MCV RBC AUTO: 96.6 FL — SIGNIFICANT CHANGE UP (ref 80–100)
NRBC # BLD: 0 /100 WBCS — SIGNIFICANT CHANGE UP
NRBC # FLD: 0 K/UL — SIGNIFICANT CHANGE UP
PHOSPHATE SERPL-MCNC: 3.9 MG/DL — SIGNIFICANT CHANGE UP (ref 2.5–4.5)
PLATELET # BLD AUTO: 228 K/UL — SIGNIFICANT CHANGE UP (ref 150–400)
POTASSIUM SERPL-MCNC: 3.9 MMOL/L — SIGNIFICANT CHANGE UP (ref 3.5–5.3)
POTASSIUM SERPL-SCNC: 3.9 MMOL/L — SIGNIFICANT CHANGE UP (ref 3.5–5.3)
RBC # BLD: 5.02 M/UL — SIGNIFICANT CHANGE UP (ref 4.2–5.8)
RBC # FLD: 12.1 % — SIGNIFICANT CHANGE UP (ref 10.3–14.5)
SODIUM SERPL-SCNC: 134 MMOL/L — LOW (ref 135–145)
T3 SERPL-MCNC: 91 NG/DL — SIGNIFICANT CHANGE UP (ref 80–200)
T4 FREE SERPL-MCNC: 1.1 NG/DL — SIGNIFICANT CHANGE UP (ref 0.9–1.8)
TSH SERPL-MCNC: 1.91 UIU/ML — SIGNIFICANT CHANGE UP (ref 0.27–4.2)
VIT B12 SERPL-MCNC: 288 PG/ML — SIGNIFICANT CHANGE UP (ref 200–900)
WBC # BLD: 8.36 K/UL — SIGNIFICANT CHANGE UP (ref 3.8–10.5)
WBC # FLD AUTO: 8.36 K/UL — SIGNIFICANT CHANGE UP (ref 3.8–10.5)

## 2021-04-25 RX ORDER — SODIUM CHLORIDE 9 MG/ML
500 INJECTION INTRAMUSCULAR; INTRAVENOUS; SUBCUTANEOUS ONCE
Refills: 0 | Status: COMPLETED | OUTPATIENT
Start: 2021-04-25 | End: 2021-04-25

## 2021-04-25 RX ORDER — GABAPENTIN 400 MG/1
100 CAPSULE ORAL DAILY
Refills: 0 | Status: DISCONTINUED | OUTPATIENT
Start: 2021-04-25 | End: 2021-04-27

## 2021-04-25 RX ORDER — SODIUM CHLORIDE 9 MG/ML
1000 INJECTION INTRAMUSCULAR; INTRAVENOUS; SUBCUTANEOUS
Refills: 0 | Status: DISCONTINUED | OUTPATIENT
Start: 2021-04-25 | End: 2021-04-26

## 2021-04-25 RX ORDER — GABAPENTIN 400 MG/1
200 CAPSULE ORAL AT BEDTIME
Refills: 0 | Status: DISCONTINUED | OUTPATIENT
Start: 2021-04-25 | End: 2021-04-27

## 2021-04-25 RX ORDER — PREGABALIN 225 MG/1
1000 CAPSULE ORAL DAILY
Refills: 0 | Status: COMPLETED | OUTPATIENT
Start: 2021-04-25 | End: 2021-04-26

## 2021-04-25 RX ADMIN — GABAPENTIN 200 MILLIGRAM(S): 400 CAPSULE ORAL at 23:14

## 2021-04-25 RX ADMIN — LIDOCAINE 1 PATCH: 4 CREAM TOPICAL at 11:47

## 2021-04-25 RX ADMIN — LIDOCAINE 1 PATCH: 4 CREAM TOPICAL at 01:00

## 2021-04-25 RX ADMIN — Medication 12.5 MILLIGRAM(S): at 23:15

## 2021-04-25 RX ADMIN — LIDOCAINE 1 PATCH: 4 CREAM TOPICAL at 23:30

## 2021-04-25 RX ADMIN — Medication 12.5 MILLIGRAM(S): at 11:47

## 2021-04-25 RX ADMIN — PREGABALIN 1000 MICROGRAM(S): 225 CAPSULE ORAL at 17:46

## 2021-04-25 RX ADMIN — LIDOCAINE 1 PATCH: 4 CREAM TOPICAL at 18:22

## 2021-04-25 RX ADMIN — SODIUM CHLORIDE 500 MILLILITER(S): 9 INJECTION INTRAMUSCULAR; INTRAVENOUS; SUBCUTANEOUS at 06:09

## 2021-04-25 RX ADMIN — SODIUM CHLORIDE 100 MILLILITER(S): 9 INJECTION INTRAMUSCULAR; INTRAVENOUS; SUBCUTANEOUS at 12:59

## 2021-04-25 RX ADMIN — GABAPENTIN 100 MILLIGRAM(S): 400 CAPSULE ORAL at 05:49

## 2021-04-25 NOTE — CHART NOTE - NSCHARTNOTEFT_GEN_A_CORE
RN paged to notify that patient has c/o dizziness when woke up in am around 0530 and  remains dizzy when walked to bathroom as per primary RN. Assessed the patient, patient reports positive dizziness which is more while standing. No c/o SOB, CP, palpitations at this time.  Instructed RN to do orthostatics once. Patient noted to have positive orthostatics , see vitals  flow sheet for BP readings. Ordered NS 500cc bolus x1 dose. Will endorse to primary team in am to F/U
m< from: MR Lumbar Spine No Cont (04.24.21 @ 14:43) >    Magnetic resonance imaging of the lumbosacral spine was carried out with sagittal surface coil imaging from T11-12 to S1-2 using T1 and fast spin echo T2 weighted images with and without fat saturation technique with coronal T1 and axial T1 and fast spin echo T2 weighted imaging on a 1.5 Jovana magnet.    The visualized thoracic and lumbosacral vertebral bodies are normal in height and signal intensity. There is mild degenerative anterolisthesis of L3 on L4. Mild loss of signal on the T2-weighted images the L3-4, L4-5 and L5-S1 disc spaces are identified consistent with degenerative disc disease.      There is a mild annular bulge at L3-4 with the grade 1 anterolisthesis L3 on L4 and mild degenerative facet changes.. This yields a mild degree of spinal stenosis.    There is a small central and right-sided disc herniation at L4-5 with extension down behind the L5 vertebralbody with mild thecal sac compression. L5-S1 demonstrates mild degenerative facet change on the left.    The conus medullaris is normal in appearance and terminates at the T12-L1 level.    The paraspinal soft tissues are unremarkable.      IMPRESSION: Grade 1 anterolisthesis of L3 and L4 with degenerative facet changes and mild spinal stenosis. Mild degenerative disc disease L3-4, L4-5 and L5-S1. Small central and right-sided disc herniation L4-5 extending downward behind the L5 vertebral body with mild thecal sac compression.    mri reviewed   small central and right sided disc at l4/5 that is causing mild thecal sac compression   gabapentin 100 bid started ( if pt tolerates it)   lidocaine patch  recommend conservative management with outpt physical therapy and medications   can get surgical opinion, and f/u with surgery after trying physcial therapy and medications

## 2021-04-25 NOTE — PHYSICAL THERAPY INITIAL EVALUATION ADULT - PERTINENT HX OF CURRENT PROBLEM, REHAB EVAL
patient is a 61 year old male admitted for syncope. CVA workup negative, ACS workup negative, orthostatics. as per neuro note, vertigo central vs peripheral

## 2021-04-25 NOTE — PROGRESS NOTE ADULT - SUBJECTIVE AND OBJECTIVE BOX
[facility]      Seen earlier today, and examined.  - Today, patient is without complaints.           *****MEDICATIONS: Current medication reviewed and documented.    MEDICATIONS  (STANDING):  cyanocobalamin Injectable 1000 MICROGram(s) SubCutaneous daily  dextrose 40% Gel 15 Gram(s) Oral once  dextrose 5%. 1000 milliLiter(s) (50 mL/Hr) IV Continuous <Continuous>  dextrose 5%. 1000 milliLiter(s) (100 mL/Hr) IV Continuous <Continuous>  dextrose 50% Injectable 25 Gram(s) IV Push once  dextrose 50% Injectable 12.5 Gram(s) IV Push once  dextrose 50% Injectable 25 Gram(s) IV Push once  gabapentin 100 milliGRAM(s) Oral two times a day  glucagon  Injectable 1 milliGRAM(s) IntraMuscular once  insulin lispro (ADMELOG) corrective regimen sliding scale   SubCutaneous three times a day before meals  insulin lispro (ADMELOG) corrective regimen sliding scale   SubCutaneous at bedtime  lidocaine   Patch 1 Patch Transdermal daily  meclizine 12.5 milliGRAM(s) Oral two times a day  sodium chloride 0.9%. 1000 milliLiter(s) (100 mL/Hr) IV Continuous <Continuous>  sodium chloride 0.9%. 1000 milliLiter(s) (100 mL/Hr) IV Continuous <Continuous>    MEDICATIONS  (PRN):          ***** VITAL SIGNS:  T(F): 98.1 (21 @ 12:05), Max: 98.1 (21 @ 22:41)  HR: 78 (21 @ 12:05) (77 - 78)  BP: 111/72 (21 @ 12:05) (107/55 - 130/60)  RR: 18 (21 @ 12:05) (18 - 18)  SpO2: 96% (21 @ 12:05) (96% - 96%)  Wt(kg): --  ,   I&O's Summary           *****PHYSICAL EXAM:   Alert oriented x 3   Attention comprehension are fair. Able to name, repeat, read without any difficulty.   Able to follow 3 step commands.     EOMI fundi not visualized,  VFF to confrontration  No facial asymmetry   Tongue is midline   Palate elevates symmetrically   Moving all 4 ext symmetrically no pronator drift   Reflexes are symmetric throughout   sensation is grossly symmetric  Gait : not assessed.  B/L down going toes          *****LAB AND IMAGIN.5   8.36  )-----------( 228      ( 2021 07:43 )             48.5               04-    134<L>  |  101  |  9   ----------------------------<  99  3.9   |  24  |  0.80    Ca    9.1      2021 07:43  Phos  3.9       Mg     2.3                                [All pertinent recent Imaging/Reports reviewed]           *****A S S E S S M E N T   A N D   P L A N :   Patient is a 62 y/o M PMH DM2, lap band surg 2021 had neg cardiac workup w/ stress + echo 2020 @ [facility] p/w syncope  in AM. Reports feeling groggy upon awakening on , felt lightheaded when standing up, went back to bed. Woke up several hours later, ate breakfast, stood up, felt lightheaded again, unsteady, woke up on the floor. Reports no recent illness, regular PO intake, FS normal at the times of incidents.   Problem/Recommendations 1:  vertigo likely exacerbating orthostatic hypotension   central vs. peripheral vertigo   cta without any occlusion in the head or neck   mri brain  no acute infarct   given weight loss of 55 lbs ( lap band)   thiamine iv 500 q 8 h   orthostatic hypotension likely sec diabetic autonomic neuropathy    b12 low, likely can be contributory to orthostatic hypotension,will supplement parenterally   pt eval will consider outpt vestibular rehab  will demonstrate semont maneuver to be done on his own.    Problem/Recommendations 2:  back pain   right sided   mri ls pine c/w l4/5 disc with mild thecal sac indentation   lidocaine patch   gabapentin 100 am 200 qhs         Thank you for allowing me to participate in the care of this patient. Will continue to follow patient periodically. Please do not hesitate to call me if you have any  questions or if there has been a change in patients neurological status     ________________  Anahy Monaco MD  [facility] ([facility]  [phone]      33 minutes spent on total encounter; more than 50 % of the visit was  spent counseling about plan of care, compliance to diet/exercise and medication regimen and or  coordinating care by the attending physician.      It is advised that stroke patients follow up with VIKTORIA Mensah @ [phone] in 1- 2 weeks.   Others please follow up with Dr. Michael Nissenbaum [phone]

## 2021-04-25 NOTE — PROGRESS NOTE ADULT - SUBJECTIVE AND OBJECTIVE BOX
EP Attending    HISTORY OF PRESENT ILLNESS:   Patient is a 60 y/o M PMH DM2, lap band surg 1/2021 had neg cardiac workup w/ stress + echo 9/2020 @ [facility] p/w syncope 4/21 in AM. Reports feeling groggy upon awakening on 4/21, felt lightheaded when standing up, went back to bed. Woke up several hours later, ate breakfast, stood up, felt lightheaded again, unsteady, woke up on the floor. Reports no recent illness, regular PO intake, FS normal at the times of incidents. (22 Apr 2021 05:10)    Mr Hahn reports undergoing lap-band surgery ~4-5 months ago, and has done well since with ~50lbs of weight loss.  Often regurgitates solid foods that don't fit thru the band. He reports being happy w/ results and is working his diet around this issue.  He describes multiple episodes of postural collapse with loss of consciousness on the day of admission, starting first-thing in the morning upon waking up, and not improving with eating a few small meals and having further rest in bed.  He describes what initially sounds like a vertiginous component of dysequilibrium, "walking like hes drunk... bouncing from wall to wall unable to maintain a stable direction", but then develops a presyncopal lightheadedness with blurry vision and feeling 'foggy'.  He then recalls gaining consciousness on the floor without recalling falling down.  No significant injuries sustained.  Symptoms worsen with lying-->siting, and sitting-->standing.  He has only transferred from bed-to-wheelchair and vice versa in the ED, with symptoms, and has not walked in the ED for fear of fainting again.  He has no prior episodes of lightheadedness or fainting.    He is not experiencing any palpitations, angina, orthopnea/PND, pleuritic chest pain or leg cramping.  Has chronic low-back-pain that is active at this time.  He does not drink smoke or use drugs.  A 10 pt ROS is otherwise negative.    4/23- feeling a bit better today. able to ambulate in room with some lightheaded "head-buzzing" sensation but no falls or near-falls.  no headache.  no palpitations.  remarks that orthostatic vitals were performed and he still has large increases in HR from sitting to standing (20+bpm)  4/25- Reports ongoing symptoms since admit.  FEels dizzy when sitting up or standing up.  Denies Palpitations.      Review of Systems:   Constitutional: [ ] fevers, [ ] chills.   Skin: [ ] dry skin. [ ] rashes.  Psychiatric: [ ] depression, [ ] anxiety.   Gastrointestinal: [ ] BRBPR, [ ] melena.   Neurological: [ ] confusion. [ ] seizures. [ ] shuffling gait.   Ears,Nose,Mouth and Throat: [ ] ear pain [ ] sore throat.   Eyes: [ ] diplopia.   Respiratory: [ ] hemoptysis. [ ] shortness of breath  Cardiovascular: See HPI above  Hematologic/Lymphatic: [ ] anemia. [ ] painful nodes. [ ] prolonged bleeding.   Genitourinary: [ ] hematuria. [ ] flank pain.   Endocrine: [ ] significant change in weight. [ ] intolerance to heat and cold.     Review of systems [x] otherwise negative, [ ] otherwise unable to obtain    FH: no family history of sudden cardiac death in first degree relatives    SH: [ ] tobacco, [ ] alcohol, [ ] drugs    dextrose 40% Gel 15 Gram(s) Oral once  dextrose 5%. 1000 milliLiter(s) IV Continuous <Continuous>  dextrose 5%. 1000 milliLiter(s) IV Continuous <Continuous>  dextrose 50% Injectable 25 Gram(s) IV Push once  dextrose 50% Injectable 12.5 Gram(s) IV Push once  dextrose 50% Injectable 25 Gram(s) IV Push once  gabapentin 100 milliGRAM(s) Oral two times a day  glucagon  Injectable 1 milliGRAM(s) IntraMuscular once  insulin lispro (ADMELOG) corrective regimen sliding scale   SubCutaneous three times a day before meals  insulin lispro (ADMELOG) corrective regimen sliding scale   SubCutaneous at bedtime  lidocaine   Patch 1 Patch Transdermal daily  meclizine 12.5 milliGRAM(s) Oral two times a day  sodium chloride 0.9%. 1000 milliLiter(s) IV Continuous <Continuous>  sodium chloride 0.9%. 1000 milliLiter(s) IV Continuous <Continuous>                            16.5   8.36  )-----------( 228      ( 25 Apr 2021 07:43 )             48.5       04-25    134<L>  |  101  |  9   ----------------------------<  99  3.9   |  24  |  0.80    Ca    9.1      25 Apr 2021 07:43  Phos  3.9     04-25  Mg     2.3     04-25      T(C): 36.7 (04-25-21 @ 12:05), Max: 36.7 (04-24-21 @ 22:41)  HR: 78 (04-25-21 @ 12:05) (77 - 78)  BP: 111/72 (04-25-21 @ 12:05) (107/55 - 130/60)  RR: 18 (04-25-21 @ 12:05) (18 - 18)  SpO2: 96% (04-25-21 @ 12:05) (96% - 96%)      General: Well nourished, no acute distress, alert and oriented x 3  Head: normocephalic, no trauma  Neck: no JVD, no bruit, supple, not enlarged  CV: S1S2, no S3, regular rate, rhythm is SINUS, no murmurs.    Lungs: clear BL, no rales or wheezes  Abdomen: bowel sounds +, soft, nontender, nondistended  Extremities: no clubbing, cyanosis or edema  Neuro: Moves all 4 extremities, sensation intact x 4 extremities  Skin: warm and moist, normal turgor  Psych: Mood and affect are appropriate for circumstances  MSK: normal range of motion and strength x4 extremities.    TELEMETRY: NSR.  Orthostatic increase in HR from 82 --> 98bpm, from lying to sitting, reproducible.	    ECG: NSR    < from: Transthoracic Echocardiogram (04.24.21 @ 08:37) >  CONCLUSIONS:  1. Mitral annular calcification and calcified mitral  leaflets with normal diastolic opening. Minimal mitral  regurgitation.  2. Moderate concentric left ventricular hypertrophy.  3. Normal left ventricular systolic function. No segmental  wall motion abnormalities.  4. Mild diastolic dysfunction (Stage I).  5. The right ventricle is not well visualized; grossly  normal right ventricular systolic function.  6. Normal tricuspid valve. Minimal tricuspid regurgitation.  7. Estimated pulmonary artery systolic pressure equals 22  mm Hg, assuming right atrial pressure equals 10  mm Hg,  consistent with normal pulmonary pressures.  *** No previous Echo exam.  ------------------------------------------------------------------------  Confirmed on  4/24/2021 - 13:12:37 by Kenny Xie MD, FACC,  FASE, RPVI    < end of copied text >    ASSESSMENT/PLAN: 	61y Male subacutely s/p lap-band surgery, presented with syncopal episodes on day of arrival.  Mechanism of syncope is consistent with orthostasis.      --tele events overnight were only c/w SR/ brief Sinus tachycardia to 130  --Remains orthostatic today  --Getting more IVF and placement of stockings  --suspect due to autonomic dysfxn  --Neuro eval appreciated  --Echo with structurally normal heart

## 2021-04-25 NOTE — PROGRESS NOTE ADULT - SUBJECTIVE AND OBJECTIVE BOX
chief complaint: syncope     extended hpi: 60 y/o M PMH DM2 x 10 years, Diabetic neuropathy in L foot, s/p lap band surg 1/2021,  reports neg cardiac workup w/ stress + echo 9/2020 @ [facility] admitted with syncope.     S: no chest pain or sob; ros otherwise negative.     Review of Systems:   Constitutional: [ ] fevers, [ ] chills.   Skin: [ ] dry skin. [ ] rashes.  Psychiatric: [ ] depression, [ ] anxiety.   Gastrointestinal: [ ] BRBPR, [ ] melena.   Neurological: [ ] confusion. [ ] seizures. [ ] shuffling gait.   Ears,Nose,Mouth and Throat: [ ] ear pain [ ] sore throat.   Eyes: [ ] diplopia.   Respiratory: [ ] hemoptysis. [ ] shortness of breath  Cardiovascular: See HPI above  Hematologic/Lymphatic: [ ] anemia. [ ] painful nodes. [ ] prolonged bleeding.   Genitourinary: [ ] hematuria. [ ] flank pain.   Endocrine: [ ] significant change in weight. [ ] intolerance to heat and cold.     Review of systems [x] otherwise negative, [ ] otherwise unable to obtain    FH: no family history of sudden cardiac death in first degree relatives    SH: [ ] tobacco, [ ] alcohol, [ ] drugs    cyanocobalamin Injectable 1000 MICROGram(s) SubCutaneous daily  dextrose 40% Gel 15 Gram(s) Oral once  dextrose 5%. 1000 milliLiter(s) IV Continuous <Continuous>  dextrose 5%. 1000 milliLiter(s) IV Continuous <Continuous>  dextrose 50% Injectable 25 Gram(s) IV Push once  dextrose 50% Injectable 12.5 Gram(s) IV Push once  dextrose 50% Injectable 25 Gram(s) IV Push once  gabapentin 100 milliGRAM(s) Oral daily  gabapentin 200 milliGRAM(s) Oral at bedtime  glucagon  Injectable 1 milliGRAM(s) IntraMuscular once  insulin lispro (ADMELOG) corrective regimen sliding scale   SubCutaneous three times a day before meals  insulin lispro (ADMELOG) corrective regimen sliding scale   SubCutaneous at bedtime  lidocaine   Patch 1 Patch Transdermal daily  meclizine 12.5 milliGRAM(s) Oral two times a day  sodium chloride 0.9%. 1000 milliLiter(s) IV Continuous <Continuous>  sodium chloride 0.9%. 1000 milliLiter(s) IV Continuous <Continuous>                            16.5   8.36  )-----------( 228      ( 25 Apr 2021 07:43 )             48.5       04-25    134<L>  |  101  |  9   ----------------------------<  99  3.9   |  24  |  0.80    Ca    9.1      25 Apr 2021 07:43  Phos  3.9     04-25  Mg     2.3     04-25              T(C): 36.7 (04-25-21 @ 12:05), Max: 36.7 (04-24-21 @ 22:41)  HR: 78 (04-25-21 @ 12:05) (77 - 78)  BP: 111/72 (04-25-21 @ 12:05) (107/55 - 130/60)  RR: 18 (04-25-21 @ 12:05) (18 - 18)  SpO2: 96% (04-25-21 @ 12:05) (96% - 96%)  Wt(kg): --    I&O's Summary      General: Well nourished in no acute distress. Alert and Oriented * 3.   Head: Normocephalic and atraumatic.   Neck: No JVD. No bruits. Supple. Does not appear to be enlarged.   Cardiovascular: + S1,S2 ; RRR Soft systolic murmur at the left lower sternal border. No rubs noted.    Lungs: CTA b/l. No rhonchi, rales or wheezes.   Abdomen: + BS, soft. Non tender. Non distended. No rebound. No guarding.   Extremities: No clubbing/cyanosis/edema.   Neurologic: Moves all four extremities. Full range of motion.   Skin: Warm and moist. The patient's skin has normal elasticity and good skin turgor.   Psychiatric: Appropriate mood and affect.  Musculoskeletal: Normal range of motion, normal strength    Tele: SR     A/P: 60 y/o M PMH DM2 x 10 years, Diabetic neuropathy in L foot, s/p lap band surg 1/2021,  reports neg cardiac workup w/ stress + echo 9/2020 @ [facility] admitted with syncope.     -pt. found to be orthostatic  -pt. remains orthostatic  -IVF started   -TEDS stockings  -TTE with normal LV function   -monitor tele  -neuro eval appreciated   -monitor orthostatics     Guruprasad Gonzales, MD

## 2021-04-25 NOTE — PROGRESS NOTE ADULT - SUBJECTIVE AND OBJECTIVE BOX
Patient is a 61y old  Male who presents with a chief complaint of syncope (25 Apr 2021 17:14)      INTERVAL HPI/OVERNIGHT EVENTS: noted  pt seen and examined this am   events   +orthostatics, sometimes symptomatic      Vital Signs Last 24 Hrs  T(C): 36.8 (25 Apr 2021 20:35), Max: 36.8 (25 Apr 2021 20:35)  T(F): 98.3 (25 Apr 2021 20:35), Max: 98.3 (25 Apr 2021 20:35)  HR: 76 (25 Apr 2021 20:35) (76 - 78)  BP: 113/59 (25 Apr 2021 20:35) (107/55 - 130/60)  BP(mean): --  RR: 18 (25 Apr 2021 20:35) (18 - 18)  SpO2: 96% (25 Apr 2021 20:35) (96% - 96%)    cyanocobalamin Injectable 1000 MICROGram(s) SubCutaneous daily  dextrose 40% Gel 15 Gram(s) Oral once  dextrose 5%. 1000 milliLiter(s) IV Continuous <Continuous>  dextrose 5%. 1000 milliLiter(s) IV Continuous <Continuous>  dextrose 50% Injectable 25 Gram(s) IV Push once  dextrose 50% Injectable 12.5 Gram(s) IV Push once  dextrose 50% Injectable 25 Gram(s) IV Push once  gabapentin 100 milliGRAM(s) Oral daily  gabapentin 200 milliGRAM(s) Oral at bedtime  glucagon  Injectable 1 milliGRAM(s) IntraMuscular once  insulin lispro (ADMELOG) corrective regimen sliding scale   SubCutaneous three times a day before meals  insulin lispro (ADMELOG) corrective regimen sliding scale   SubCutaneous at bedtime  lidocaine   Patch 1 Patch Transdermal daily  meclizine 12.5 milliGRAM(s) Oral two times a day  sodium chloride 0.9%. 1000 milliLiter(s) IV Continuous <Continuous>  sodium chloride 0.9%. 1000 milliLiter(s) IV Continuous <Continuous>      PHYSICAL EXAM:  GENERAL: NAD,   EYES: conjunctiva and sclera clear  ENMT: Moist mucous membranes  NECK: Supple, No JVD, Normal thyroid  CHEST/LUNG: non labored, cta b/l  HEART: Regular rate and rhythm; No murmurs, rubs, or gallops  ABDOMEN: Soft, Nontender, Nondistended; Bowel sounds present  EXTREMITIES:  2+ Peripheral Pulses, No clubbing, cyanosis, or edema  LYMPH: No lymphadenopathy noted  SKIN: No rashes or lesions    Consultant(s) Notes Reviewed:  [x ] YES  [ ] NO  Care Discussed with Consultants/Other Providers [ x] YES  [ ] NO    LABS:                        16.5   8.36  )-----------( 228      ( 25 Apr 2021 07:43 )             48.5     04-25    134<L>  |  101  |  9   ----------------------------<  99  3.9   |  24  |  0.80    Ca    9.1      25 Apr 2021 07:43  Phos  3.9     04-25  Mg     2.3     04-25          CAPILLARY BLOOD GLUCOSE      POCT Blood Glucose.: 87 mg/dL (25 Apr 2021 21:29)  POCT Blood Glucose.: 117 mg/dL (25 Apr 2021 16:55)  POCT Blood Glucose.: 134 mg/dL (25 Apr 2021 11:50)  POCT Blood Glucose.: 110 mg/dL (25 Apr 2021 07:37)              RADIOLOGY & ADDITIONAL TESTS:    Imaging Personally Reviewed:  [x ] YES  [ ] NO

## 2021-04-26 LAB
ANION GAP SERPL CALC-SCNC: 9 MMOL/L — SIGNIFICANT CHANGE UP (ref 7–14)
BUN SERPL-MCNC: 8 MG/DL — SIGNIFICANT CHANGE UP (ref 7–23)
CALCIUM SERPL-MCNC: 8.8 MG/DL — SIGNIFICANT CHANGE UP (ref 8.4–10.5)
CHLORIDE SERPL-SCNC: 105 MMOL/L — SIGNIFICANT CHANGE UP (ref 98–107)
CO2 SERPL-SCNC: 24 MMOL/L — SIGNIFICANT CHANGE UP (ref 22–31)
CREAT SERPL-MCNC: 0.7 MG/DL — SIGNIFICANT CHANGE UP (ref 0.5–1.3)
GLUCOSE BLDC GLUCOMTR-MCNC: 122 MG/DL — HIGH (ref 70–99)
GLUCOSE BLDC GLUCOMTR-MCNC: 122 MG/DL — HIGH (ref 70–99)
GLUCOSE BLDC GLUCOMTR-MCNC: 197 MG/DL — HIGH (ref 70–99)
GLUCOSE BLDC GLUCOMTR-MCNC: 94 MG/DL — SIGNIFICANT CHANGE UP (ref 70–99)
GLUCOSE SERPL-MCNC: 86 MG/DL — SIGNIFICANT CHANGE UP (ref 70–99)
HCT VFR BLD CALC: 45.9 % — SIGNIFICANT CHANGE UP (ref 39–50)
HCYS SERPL-MCNC: 12.3 UMOL/L — SIGNIFICANT CHANGE UP
HGB BLD-MCNC: 15.3 G/DL — SIGNIFICANT CHANGE UP (ref 13–17)
MAGNESIUM SERPL-MCNC: 2.2 MG/DL — SIGNIFICANT CHANGE UP (ref 1.6–2.6)
MCHC RBC-ENTMCNC: 32.6 PG — SIGNIFICANT CHANGE UP (ref 27–34)
MCHC RBC-ENTMCNC: 33.3 GM/DL — SIGNIFICANT CHANGE UP (ref 32–36)
MCV RBC AUTO: 97.9 FL — SIGNIFICANT CHANGE UP (ref 80–100)
NRBC # BLD: 0 /100 WBCS — SIGNIFICANT CHANGE UP
NRBC # FLD: 0 K/UL — SIGNIFICANT CHANGE UP
PHOSPHATE SERPL-MCNC: 3.5 MG/DL — SIGNIFICANT CHANGE UP (ref 2.5–4.5)
PLATELET # BLD AUTO: 209 K/UL — SIGNIFICANT CHANGE UP (ref 150–400)
POTASSIUM SERPL-MCNC: 3.9 MMOL/L — SIGNIFICANT CHANGE UP (ref 3.5–5.3)
POTASSIUM SERPL-SCNC: 3.9 MMOL/L — SIGNIFICANT CHANGE UP (ref 3.5–5.3)
RBC # BLD: 4.69 M/UL — SIGNIFICANT CHANGE UP (ref 4.2–5.8)
RBC # FLD: 12.1 % — SIGNIFICANT CHANGE UP (ref 10.3–14.5)
SODIUM SERPL-SCNC: 138 MMOL/L — SIGNIFICANT CHANGE UP (ref 135–145)
WBC # BLD: 6.94 K/UL — SIGNIFICANT CHANGE UP (ref 3.8–10.5)
WBC # FLD AUTO: 6.94 K/UL — SIGNIFICANT CHANGE UP (ref 3.8–10.5)

## 2021-04-26 RX ORDER — MIDODRINE HYDROCHLORIDE 2.5 MG/1
5 TABLET ORAL THREE TIMES A DAY
Refills: 0 | Status: DISCONTINUED | OUTPATIENT
Start: 2021-04-27 | End: 2021-04-27

## 2021-04-26 RX ORDER — SODIUM CHLORIDE 9 MG/ML
1000 INJECTION INTRAMUSCULAR; INTRAVENOUS; SUBCUTANEOUS
Refills: 0 | Status: DISCONTINUED | OUTPATIENT
Start: 2021-04-26 | End: 2021-04-27

## 2021-04-26 RX ADMIN — Medication 12.5 MILLIGRAM(S): at 12:48

## 2021-04-26 RX ADMIN — GABAPENTIN 100 MILLIGRAM(S): 400 CAPSULE ORAL at 12:47

## 2021-04-26 RX ADMIN — PREGABALIN 1000 MICROGRAM(S): 225 CAPSULE ORAL at 12:47

## 2021-04-26 RX ADMIN — GABAPENTIN 200 MILLIGRAM(S): 400 CAPSULE ORAL at 21:14

## 2021-04-26 RX ADMIN — LIDOCAINE 1 PATCH: 4 CREAM TOPICAL at 12:47

## 2021-04-26 RX ADMIN — SODIUM CHLORIDE 100 MILLILITER(S): 9 INJECTION INTRAMUSCULAR; INTRAVENOUS; SUBCUTANEOUS at 11:21

## 2021-04-26 RX ADMIN — Medication 2: at 17:18

## 2021-04-26 RX ADMIN — LIDOCAINE 1 PATCH: 4 CREAM TOPICAL at 20:36

## 2021-04-26 NOTE — PROGRESS NOTE ADULT - ASSESSMENT
60 y/o M PMH DM2, lap band surg 1/2021 had neg cardiac workup w/ stress & echo 9/2020 @ [facility]   p/w syncope 4/21 in AM.    #syncope likely dt orthostatic hypotension  likely dt autonomic dysfuncvtion   Tele monitoring-no events so far  ruled out ACS  TTE noted  cards, EP cs noted  neuro cs appreciated-  mrihead and neck noted-no acute pathology  compression stockings    # DM2- correction scale  hba1c 5.9    #DVT ppx    PCP- [facility] Dr Borges    [facility]  [phone]
62 y/o M PMH DM2, lap band surg 1/2021 had neg cardiac workup w/ stress & echo 9/2020 @ [facility]   p/w syncope 4/21 in AM.    #syncope likely dt orthostatic hypotension  likely dt autonomic dysfuncvtion   Tele monitoring-no events so far  ruled out ACS  TTE noted- mild diastolic dysfunction otherwise normal study  cards, EP cs noted  neuro cs appreciated-  mrihead and neck noted-no acute pathology  ivf, compression stockings  ?starting midodrine if needed- dw cards    # DM2- correction scale  hba1c 5.9    #DVT ppx    PCP- [facility] Dr Borges    [facility]  [phone]
60 y/o M PMH DM2, lap band surg 1/2021 had neg cardiac workup w/ stress & echo 9/2020 @ [facility]   p/w syncope 4/21 in AM.    #syncope  Tele monitoring  ruled out ACS  +orthostasis  TTE noted  cards, EP cs noted  neuro cs    # DM2- correction scale  hba1c 5.9    #DVT ppx    PCP- [facility] Dr Borges    [facility]  [phone]
61 M PMH DM2, lap band surg 1/2021 had neg cardiac workup w/ stress + echo 9/2020 @ [facility] p/w syncope    Problem/Plan - 1:  ·  Problem: Syncope, unspecified syncope type.  Plan: monitor on telemetry, check orthostatics  cards fu     Problem/Plan - 2:  ·  Problem: Diabetes mellitus type 2, noninsulin dependent.  Plan: FS AC/QHS w/ sliding scale.     Problem/Plan - 3:  ·  Problem: Gastric band slippage.  Plan: possible slippage on imaging, no signs/symptoms of obstruction, will need Sx f/u.

## 2021-04-26 NOTE — PROGRESS NOTE ADULT - SUBJECTIVE AND OBJECTIVE BOX
[facility]      Seen earlier today, and examined.  - Today, patient is without complaints.  intermittent dizziness          *****MEDICATIONS: Current medication reviewed and documented.    MEDICATIONS  (STANDING):  dextrose 40% Gel 15 Gram(s) Oral once  dextrose 5%. 1000 milliLiter(s) (50 mL/Hr) IV Continuous <Continuous>  dextrose 5%. 1000 milliLiter(s) (100 mL/Hr) IV Continuous <Continuous>  dextrose 50% Injectable 25 Gram(s) IV Push once  dextrose 50% Injectable 12.5 Gram(s) IV Push once  dextrose 50% Injectable 25 Gram(s) IV Push once  gabapentin 100 milliGRAM(s) Oral daily  gabapentin 200 milliGRAM(s) Oral at bedtime  glucagon  Injectable 1 milliGRAM(s) IntraMuscular once  insulin lispro (ADMELOG) corrective regimen sliding scale   SubCutaneous three times a day before meals  insulin lispro (ADMELOG) corrective regimen sliding scale   SubCutaneous at bedtime  lidocaine   Patch 1 Patch Transdermal daily  sodium chloride 0.9%. 1000 milliLiter(s) (100 mL/Hr) IV Continuous <Continuous>  sodium chloride 0.9%. 1000 milliLiter(s) (100 mL/Hr) IV Continuous <Continuous>    MEDICATIONS  (PRN):          ***** VITAL SIGNS:  T(F): 98 (04-26-21 @ 11:40), Max: 98.3 (04-25-21 @ 20:35)  HR: 74 (04-26-21 @ 11:40) (73 - 76)  BP: 103/64 (04-26-21 @ 11:40) (103/64 - 113/59)  RR: 18 (04-26-21 @ 11:40) (18 - 18)  SpO2: 97% (04-26-21 @ 11:40) (96% - 97%)  Wt(kg): --  ,   I&O's Summary           *****PHYSICAL EXAM:   Alert oriented x 3   Attention comprehension are fair. Able to name, repeat, read without any difficulty.   Able to follow 3 step commands.     EOMI fundi not visualized,  VFF to confrontration  No facial asymmetry   Tongue is midline   Palate elevates symmetrically   Moving all 4 ext symmetrically      sensation is grossly symmetric  Gait : not assessed.  B/L down going toes          *****LAB AND IMAGING:                        15.3   6.94  )-----------( 209      ( 26 Apr 2021 06:55 )             45.9               04-26    138  |  105  |  8   ----------------------------<  86  3.9   |  24  |  0.70    Ca    8.8      26 Apr 2021 06:55  Phos  3.5     04-26  Mg     2.2     04-26                           [All pertinent recent Imaging/Reports reviewed]           *****A S S E S S M E N T   A N D   P L A N :       Patient is a 60 y/o M PMH DM2, lap band surg 1/2021 had neg cardiac workup w/ stress + echo 9/2020 @ [facility] p/w syncope 4/21 in AM. Reports feeling groggy upon awakening on 4/21, felt lightheaded when standing up, went back to bed. Woke up several hours later, ate breakfast, stood up, felt lightheaded again, unsteady, woke up on the floor. Reports no recent illness, regular PO intake, FS normal at the times of incidents.   Problem/Recommendations 1:  vertigo likely exacerbating orthostatic hypotension   central vs. peripheral vertigo   cta without any occlusion in the head or neck   mri brain  no acute infarct   given weight loss of 55 lbs ( lap band)   thiamine iv 500 q 8 h   orthostatic hypotension likely sec diabetic autonomic neuropathy    b12 low, likely can be contributory to orthostatic hypotension,will supplement parenterally   pt eval will consider outpt vestibular rehab  will demonstrate semont maneuver to be done on his own.    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 Will continue to follow patient periodically. Please do not hesitate to call me if you have any  questions or if there has been a change in patients neurological status     ________________  Anahy Monaco MD  [facility] ([facility]  [phone]      33 minutes spent on total encounter; more than 50 % of the visit was  spent counseling about plan of care, compliance to diet/exercise and medication regimen and or  coordinating care by the attending physician.      It is advised that stroke patients follow up with VIKTORIA Mensha @ [phone] in 1- 2 weeks.   Others please follow up with Dr. Michael Nissenbaum [phone] [facility]      Seen earlier today, and examined.  - Today, patient is without complaints.  intermittent dizziness          *****MEDICATIONS: Current medication reviewed and documented.    MEDICATIONS  (STANDING):  dextrose 40% Gel 15 Gram(s) Oral once  dextrose 5%. 1000 milliLiter(s) (50 mL/Hr) IV Continuous <Continuous>  dextrose 5%. 1000 milliLiter(s) (100 mL/Hr) IV Continuous <Continuous>  dextrose 50% Injectable 25 Gram(s) IV Push once  dextrose 50% Injectable 12.5 Gram(s) IV Push once  dextrose 50% Injectable 25 Gram(s) IV Push once  gabapentin 100 milliGRAM(s) Oral daily  gabapentin 200 milliGRAM(s) Oral at bedtime  glucagon  Injectable 1 milliGRAM(s) IntraMuscular once  insulin lispro (ADMELOG) corrective regimen sliding scale   SubCutaneous three times a day before meals  insulin lispro (ADMELOG) corrective regimen sliding scale   SubCutaneous at bedtime  lidocaine   Patch 1 Patch Transdermal daily  sodium chloride 0.9%. 1000 milliLiter(s) (100 mL/Hr) IV Continuous <Continuous>  sodium chloride 0.9%. 1000 milliLiter(s) (100 mL/Hr) IV Continuous <Continuous>    MEDICATIONS  (PRN):          ***** VITAL SIGNS:  T(F): 98 (04-26-21 @ 11:40), Max: 98.3 (04-25-21 @ 20:35)  HR: 74 (04-26-21 @ 11:40) (73 - 76)  BP: 103/64 (04-26-21 @ 11:40) (103/64 - 113/59)  RR: 18 (04-26-21 @ 11:40) (18 - 18)  SpO2: 97% (04-26-21 @ 11:40) (96% - 97%)  Wt(kg): --  ,   I&O's Summary           *****PHYSICAL EXAM:   Alert oriented x 3   Attention comprehension are fair. Able to name, repeat, read without any difficulty.   Able to follow 3 step commands.     EOMI fundi not visualized,  VFF to confrontration  No facial asymmetry   Tongue is midline   Palate elevates symmetrically   Moving all 4 ext symmetrically      sensation is grossly symmetric  Gait : not assessed.  B/L down going toes          *****LAB AND IMAGING:                        15.3   6.94  )-----------( 209      ( 26 Apr 2021 06:55 )             45.9               04-26    138  |  105  |  8   ----------------------------<  86  3.9   |  24  |  0.70    Ca    8.8      26 Apr 2021 06:55  Phos  3.5     04-26  Mg     2.2     04-26                           [All pertinent recent Imaging/Reports reviewed]           *****A S S E S S M E N T   A N D   P L A N :       Patient is a 62 y/o M PMH DM2, lap band surg 1/2021 had neg cardiac workup w/ stress + echo 9/2020 @ [facility] p/w syncope 4/21 in AM. Reports feeling groggy upon awakening on 4/21, felt lightheaded when standing up, went back to bed. Woke up several hours later, ate breakfast, stood up, felt lightheaded again, unsteady, woke up on the floor. Reports no recent illness, regular PO intake, FS normal at the times of incidents.   Problem/Recommendations 1: dizziness poorly described   ddx: vertigo exacerbating underlying   orthostatic hypotension      cta without any occlusion in the head or neck   mri brain  no acute infarct   given weight loss of 55 lbs ( lap band)   thiamine iv 500 q 8 h   orthostatic hypotension likely sec diabetic autonomic neuropathy    b12 low, likely can be contributory to orthostatic hypotension,will supplement parenterally   pt eval will consider outpt vestibular rehab  will demonstrate semont maneuver to be done on his own.    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  She reports that he is extremely noncompliant to diet/lifestyle chnges      Thank you for allowing me to participate in the care of this patient. Will continue to follow patient periodically. Please do not hesitate to call me if you have any  questions or if there has been a change in patients neurological status     ________________  Anahy Monaco MD  [facility] Neurological [facility] ([facility]  [phone]      33 minutes spent on total encounter; more than 50 % of the visit was  spent counseling about plan of care, compliance to diet/exercise and medication regimen and or  coordinating care by the attending physician.      It is advised that stroke patients follow up with VIKTORIA Mensah @ [phone] in 1- 2 weeks.   Others please follow up with Dr. Michael Nissenbaum [phone]

## 2021-04-26 NOTE — PROGRESS NOTE ADULT - SUBJECTIVE AND OBJECTIVE BOX
Patient is a 61y old  Male who presents with a chief complaint of syncope (26 Apr 2021 13:21)      INTERVAL HPI/OVERNIGHT EVENTS: noted  pt seen and examined this am   events noted  remains grossly orthostatic, symptomatic at times      Vital Signs Last 24 Hrs  T(C): 36.7 (26 Apr 2021 11:40), Max: 36.8 (25 Apr 2021 20:35)  T(F): 98 (26 Apr 2021 11:40), Max: 98.3 (25 Apr 2021 20:35)  HR: 74 (26 Apr 2021 11:40) (73 - 76)  BP: 103/64 (26 Apr 2021 11:40) (103/64 - 113/59)  BP(mean): --  RR: 18 (26 Apr 2021 11:40) (18 - 18)  SpO2: 97% (26 Apr 2021 11:40) (96% - 97%)    dextrose 40% Gel 15 Gram(s) Oral once  dextrose 5%. 1000 milliLiter(s) IV Continuous <Continuous>  dextrose 5%. 1000 milliLiter(s) IV Continuous <Continuous>  dextrose 50% Injectable 25 Gram(s) IV Push once  dextrose 50% Injectable 12.5 Gram(s) IV Push once  dextrose 50% Injectable 25 Gram(s) IV Push once  gabapentin 100 milliGRAM(s) Oral daily  gabapentin 200 milliGRAM(s) Oral at bedtime  glucagon  Injectable 1 milliGRAM(s) IntraMuscular once  insulin lispro (ADMELOG) corrective regimen sliding scale   SubCutaneous three times a day before meals  insulin lispro (ADMELOG) corrective regimen sliding scale   SubCutaneous at bedtime  lidocaine   Patch 1 Patch Transdermal daily  meclizine 12.5 milliGRAM(s) Oral two times a day  sodium chloride 0.9%. 1000 milliLiter(s) IV Continuous <Continuous>  sodium chloride 0.9%. 1000 milliLiter(s) IV Continuous <Continuous>      PHYSICAL EXAM:  GENERAL: NAD,   EYES: conjunctiva and sclera clear  ENMT: Moist mucous membranes  NECK: Supple, No JVD, Normal thyroid  CHEST/LUNG: non labored, cta b/l  HEART: Regular rate and rhythm; No murmurs, rubs, or gallops  ABDOMEN: Soft, Nontender, Nondistended; Bowel sounds present  EXTREMITIES:  2+ Peripheral Pulses, No clubbing, cyanosis, or edema  LYMPH: No lymphadenopathy noted  SKIN: No rashes or lesions    Consultant(s) Notes Reviewed:  [x ] YES  [ ] NO  Care Discussed with Consultants/Other Providers [ x] YES  [ ] NO    LABS:                        15.3   6.94  )-----------( 209      ( 26 Apr 2021 06:55 )             45.9     04-26    138  |  105  |  8   ----------------------------<  86  3.9   |  24  |  0.70    Ca    8.8      26 Apr 2021 06:55  Phos  3.5     04-26  Mg     2.2     04-26          CAPILLARY BLOOD GLUCOSE      POCT Blood Glucose.: 122 mg/dL (26 Apr 2021 11:45)  POCT Blood Glucose.: 94 mg/dL (26 Apr 2021 07:51)  POCT Blood Glucose.: 87 mg/dL (25 Apr 2021 21:29)  POCT Blood Glucose.: 117 mg/dL (25 Apr 2021 16:55)              RADIOLOGY & ADDITIONAL TESTS:    Imaging Personally Reviewed:  [x ] YES  [ ] NO

## 2021-04-26 NOTE — PROGRESS NOTE ADULT - SUBJECTIVE AND OBJECTIVE BOX
EP Attending    HISTORY OF PRESENT ILLNESS:   Patient is a 62 y/o M PMH DM2, lap band surg 1/2021 had neg cardiac workup w/ stress + echo 9/2020 @ [facility] p/w syncope 4/21 in AM. Reports feeling groggy upon awakening on 4/21, felt lightheaded when standing up, went back to bed. Woke up several hours later, ate breakfast, stood up, felt lightheaded again, unsteady, woke up on the floor. Reports no recent illness, regular PO intake, FS normal at the times of incidents. (22 Apr 2021 05:10)    Mr Hahn reports undergoing lap-band surgery ~4-5 months ago, and has done well since with ~50lbs of weight loss.  Often regurgitates solid foods that don't fit thru the band. He reports being happy w/ results and is working his diet around this issue.  He describes multiple episodes of postural collapse with loss of consciousness on the day of admission, starting first-thing in the morning upon waking up, and not improving with eating a few small meals and having further rest in bed.  He describes what initially sounds like a vertiginous component of dysequilibrium, "walking like hes drunk... bouncing from wall to wall unable to maintain a stable direction", but then develops a presyncopal lightheadedness with blurry vision and feeling 'foggy'.  He then recalls gaining consciousness on the floor without recalling falling down.  No significant injuries sustained.  Symptoms worsen with lying-->siting, and sitting-->standing.  He has only transferred from bed-to-wheelchair and vice versa in the ED, with symptoms, and has not walked in the ED for fear of fainting again.  He has no prior episodes of lightheadedness or fainting.    He is not experiencing any palpitations, angina, orthopnea/PND, pleuritic chest pain or leg cramping.  Has chronic low-back-pain that is active at this time.  He does not drink smoke or use drugs.  A 10 pt ROS is otherwise negative.    4/25- Reports ongoing symptoms since admit.  FEels dizzy when sitting up or standing up.  Denies Palpitations.    4/26- uneventful overnight, still dizzy with ambulation and objectively with orthostatic hypotension.    Review of Systems:   Constitutional: [ ] fevers, [ ] chills.   Skin: [ ] dry skin. [ ] rashes.  Psychiatric: [ ] depression, [ ] anxiety.   Gastrointestinal: [ ] BRBPR, [ ] melena.   Neurological: [ ] confusion. [ ] seizures. [ ] shuffling gait.   Ears,Nose,Mouth and Throat: [ ] ear pain [ ] sore throat.   Eyes: [ ] diplopia.   Respiratory: [ ] hemoptysis. [ ] shortness of breath  Cardiovascular: See HPI above  Hematologic/Lymphatic: [ ] anemia. [ ] painful nodes. [ ] prolonged bleeding.   Genitourinary: [ ] hematuria. [ ] flank pain.   Endocrine: [ ] significant change in weight. [ ] intolerance to heat and cold.     Review of systems [x] otherwise negative, [ ] otherwise unable to obtain    FH: no family history of sudden cardiac death in first degree relatives    SH: [ ] tobacco, [ ] alcohol, [ ] drugs    cyanocobalamin Injectable 1000 MICROGram(s) SubCutaneous daily  dextrose 40% Gel 15 Gram(s) Oral once  dextrose 5%. 1000 milliLiter(s) IV Continuous <Continuous>  dextrose 5%. 1000 milliLiter(s) IV Continuous <Continuous>  dextrose 50% Injectable 25 Gram(s) IV Push once  dextrose 50% Injectable 12.5 Gram(s) IV Push once  dextrose 50% Injectable 25 Gram(s) IV Push once  gabapentin 100 milliGRAM(s) Oral daily  gabapentin 200 milliGRAM(s) Oral at bedtime  glucagon  Injectable 1 milliGRAM(s) IntraMuscular once  insulin lispro (ADMELOG) corrective regimen sliding scale   SubCutaneous three times a day before meals  insulin lispro (ADMELOG) corrective regimen sliding scale   SubCutaneous at bedtime  lidocaine   Patch 1 Patch Transdermal daily  meclizine 12.5 milliGRAM(s) Oral two times a day  sodium chloride 0.9%. 1000 milliLiter(s) IV Continuous <Continuous>  sodium chloride 0.9%. 1000 milliLiter(s) IV Continuous <Continuous>                            15.3   6.94  )-----------( 209      ( 26 Apr 2021 06:55 )             45.9       04-26    138  |  105  |  8   ----------------------------<  86  3.9   |  24  |  0.70    Ca    8.8      26 Apr 2021 06:55  Phos  3.5     04-26  Mg     2.2     04-26    T(C): 36.6 (04-26-21 @ 05:15), Max: 36.8 (04-25-21 @ 20:35)  HR: 73 (04-26-21 @ 05:15) (73 - 78)  BP: 104/56 (04-26-21 @ 05:15) (104/56 - 113/59)  RR: 18 (04-26-21 @ 05:15) (18 - 18)  SpO2: 97% (04-26-21 @ 05:15) (96% - 97%)  Wt(kg): --    I&O's Summary    General: Well nourished, no acute distress, alert and oriented x 3  Head: normocephalic, no trauma  Neck: no JVD, no bruit, supple, not enlarged  CV: S1S2, no S3, regular rate, rhythm is SINUS, no murmurs.    Lungs: clear BL, no rales or wheezes  Abdomen: bowel sounds +, soft, nontender, nondistended  Extremities: no clubbing, cyanosis or edema  Neuro: Moves all 4 extremities, sensation intact x 4 extremities  Skin: warm and moist, normal turgor  Psych: Mood and affect are appropriate for circumstances  MSK: normal range of motion and strength x4 extremities.    TELEMETRY: NSR.  Orthostatic increase in HR from 82 --> 98bpm, from lying to sitting, reproducible.	    ECG: NSR    < from: Transthoracic Echocardiogram (04.24.21 @ 08:37) >  CONCLUSIONS:  1. Mitral annular calcification and calcified mitral  leaflets with normal diastolic opening. Minimal mitral  regurgitation.  2. Moderate concentric left ventricular hypertrophy.  3. Normal left ventricular systolic function. No segmental  wall motion abnormalities.  4. Mild diastolic dysfunction (Stage I).  5. The right ventricle is not well visualized; grossly  normal right ventricular systolic function.  6. Normal tricuspid valve. Minimal tricuspid regurgitation.  7. Estimated pulmonary artery systolic pressure equals 22  mm Hg, assuming right atrial pressure equals 10  mm Hg,  consistent with normal pulmonary pressures.  *** No previous Echo exam.  ------------------------------------------------------------------------  Confirmed on  4/24/2021 - 13:12:37 by Kenny Xie MD, [facility],  [place]NIE, VI    < end of copied text >    ASSESSMENT/PLAN: 	61y Male subacutely s/p lap-band surgery, presented with syncopal episodes on day of arrival.  Mechanism of syncope is consistent with orthostasis.      --tele events overnight were only c/w SR/ brief Sinus tachycardia to 130  --Remains orthostatic today  --Getting more IVF and placement of stockings  --suspect due to autonomic dysfxn  --Neuro eval appreciated  --Echo with structurally normal heart    David Avery M.D.  Cardiac Electrophysiology  [phone]

## 2021-04-26 NOTE — PROGRESS NOTE ADULT - SUBJECTIVE AND OBJECTIVE BOX
chief complaint: syncope     extended hpi: 62 y/o M PMH DM2 x 10 years, Diabetic neuropathy in L foot, s/p lap band surg 1/2021,  reports neg cardiac workup w/ stress + echo 9/2020 @ [facility] admitted with syncope.     S: no chest pain or sob; still with dizziness when sitting up in bed upon exam. orthostatics remain +;  ros otherwise negative.     Review of Systems:   Constitutional: [ ] fevers, [ ] chills.   Skin: [ ] dry skin. [ ] rashes.  Psychiatric: [ ] depression, [ ] anxiety.   Gastrointestinal: [ ] BRBPR, [ ] melena.   Neurological: [ ] confusion. [ ] seizures. [ ] shuffling gait.   Ears,Nose,Mouth and Throat: [ ] ear pain [ ] sore throat.   Eyes: [ ] diplopia.   Respiratory: [ ] hemoptysis. [ ] shortness of breath  Cardiovascular: See HPI above  Hematologic/Lymphatic: [ ] anemia. [ ] painful nodes. [ ] prolonged bleeding.   Genitourinary: [ ] hematuria. [ ] flank pain.   Endocrine: [ ] significant change in weight. [ ] intolerance to heat and cold.     Review of systems [x ] otherwise negative, [ ] otherwise unable to obtain    FH: no family history of sudden cardiac death in first degree relatives    SH: [ ] tobacco, [ ] alcohol, [ ] drugs    dextrose 40% Gel 15 Gram(s) Oral once  dextrose 5%. 1000 milliLiter(s) IV Continuous <Continuous>  dextrose 5%. 1000 milliLiter(s) IV Continuous <Continuous>  dextrose 50% Injectable 25 Gram(s) IV Push once  dextrose 50% Injectable 12.5 Gram(s) IV Push once  dextrose 50% Injectable 25 Gram(s) IV Push once  gabapentin 100 milliGRAM(s) Oral daily  gabapentin 200 milliGRAM(s) Oral at bedtime  glucagon  Injectable 1 milliGRAM(s) IntraMuscular once  insulin lispro (ADMELOG) corrective regimen sliding scale   SubCutaneous three times a day before meals  insulin lispro (ADMELOG) corrective regimen sliding scale   SubCutaneous at bedtime  lidocaine   Patch 1 Patch Transdermal daily  meclizine 12.5 milliGRAM(s) Oral two times a day  sodium chloride 0.9%. 1000 milliLiter(s) IV Continuous <Continuous>  sodium chloride 0.9%. 1000 milliLiter(s) IV Continuous <Continuous>                            15.3   6.94  )-----------( 209      ( 26 Apr 2021 06:55 )             45.9     138  |  105  |  8   ----------------------------<  86  3.9   |  24  |  0.70    Ca    8.8      26 Apr 2021 06:55  Phos  3.5     04-26  Mg     2.2     04-26      T(C): 36.7 (04-26-21 @ 11:40), Max: 36.8 (04-25-21 @ 20:35)  HR: 74 (04-26-21 @ 11:40) (73 - 76)  BP: 103/64 (04-26-21 @ 11:40) (103/64 - 113/59)  RR: 18 (04-26-21 @ 11:40) (18 - 18)  SpO2: 97% (04-26-21 @ 11:40) (96% - 97%)    General: Well nourished in no acute distress. Alert and Oriented * 3.   Head: Normocephalic and atraumatic.   Neck: No JVD. No bruits. Supple. Does not appear to be enlarged.   Cardiovascular: + S1,S2 ; RRR Soft systolic murmur at the left lower sternal border. No rubs noted.    Lungs: CTA b/l. No rhonchi, rales or wheezes.   Abdomen: + BS, soft. Non tender. Non distended. No rebound. No guarding.   Extremities: No clubbing/cyanosis/edema.   Neurologic: Moves all four extremities. Full range of motion.   Skin: Warm and moist. The patient's skin has normal elasticity and good skin turgor.   Psychiatric: Appropriate mood and affect.  Musculoskeletal: Normal range of motion, normal strength    Tele: SR     A/P: 62 y/o M PMH DM2 x 10 years, Diabetic neuropathy in L foot, s/p lap band surg 1/2021,  reports neg cardiac workup w/ stress + echo 9/2020 @ [facility] admitted with syncope.     -pt. found to be orthostatic  -pt. remains orthostatic  -IVF started   -TEDS stockings  -TTE with normal LV function   -monitor tele  -neuro eval appreciated   -monitor orthostatics

## 2021-04-26 NOTE — PROGRESS NOTE ADULT - ATTENDING COMMENTS
Patient seen and examined.  Agree with above.   Remains orthostatic  IVF resumed  Stockings placed  Will consider midodrine tomorrow per neuro if remains orthostatic tomorrow  Discussed with wife at length as per patient's request     Dilan Rolon MD
discussed and agree w/ PA.  no change in EP plan.  mgmt of autonomic dysfxn per neurology.  well hydrated at this point and still w/ subjective orthostatic symptoms
discussed and agree w/ PA.  no change in clinical status. trial of meclizine per neuro.  echo results with normal EF and normal chamber size/valves.

## 2021-04-27 ENCOUNTER — TRANSCRIPTION ENCOUNTER (OUTPATIENT)
Age: 61
End: 2021-04-27

## 2021-04-27 VITALS
OXYGEN SATURATION: 100 % | RESPIRATION RATE: 16 BRPM | HEART RATE: 74 BPM | TEMPERATURE: 98 F | DIASTOLIC BLOOD PRESSURE: 60 MMHG | SYSTOLIC BLOOD PRESSURE: 116 MMHG

## 2021-04-27 LAB
ANION GAP SERPL CALC-SCNC: 9 MMOL/L — SIGNIFICANT CHANGE UP (ref 7–14)
BUN SERPL-MCNC: 6 MG/DL — LOW (ref 7–23)
CALCIUM SERPL-MCNC: 9 MG/DL — SIGNIFICANT CHANGE UP (ref 8.4–10.5)
CHLORIDE SERPL-SCNC: 104 MMOL/L — SIGNIFICANT CHANGE UP (ref 98–107)
CO2 SERPL-SCNC: 23 MMOL/L — SIGNIFICANT CHANGE UP (ref 22–31)
CREAT SERPL-MCNC: 0.67 MG/DL — SIGNIFICANT CHANGE UP (ref 0.5–1.3)
GLUCOSE BLDC GLUCOMTR-MCNC: 102 MG/DL — HIGH (ref 70–99)
GLUCOSE BLDC GLUCOMTR-MCNC: 105 MG/DL — HIGH (ref 70–99)
GLUCOSE BLDC GLUCOMTR-MCNC: 158 MG/DL — HIGH (ref 70–99)
GLUCOSE SERPL-MCNC: 110 MG/DL — HIGH (ref 70–99)
HCT VFR BLD CALC: 45.7 % — SIGNIFICANT CHANGE UP (ref 39–50)
HGB BLD-MCNC: 15.7 G/DL — SIGNIFICANT CHANGE UP (ref 13–17)
MAGNESIUM SERPL-MCNC: 2.1 MG/DL — SIGNIFICANT CHANGE UP (ref 1.6–2.6)
MCHC RBC-ENTMCNC: 32.3 PG — SIGNIFICANT CHANGE UP (ref 27–34)
MCHC RBC-ENTMCNC: 34.4 GM/DL — SIGNIFICANT CHANGE UP (ref 32–36)
MCV RBC AUTO: 94 FL — SIGNIFICANT CHANGE UP (ref 80–100)
NRBC # BLD: 0 /100 WBCS — SIGNIFICANT CHANGE UP
NRBC # FLD: 0 K/UL — SIGNIFICANT CHANGE UP
PHOSPHATE SERPL-MCNC: 3.3 MG/DL — SIGNIFICANT CHANGE UP (ref 2.5–4.5)
PLATELET # BLD AUTO: 223 K/UL — SIGNIFICANT CHANGE UP (ref 150–400)
POTASSIUM SERPL-MCNC: 4 MMOL/L — SIGNIFICANT CHANGE UP (ref 3.5–5.3)
POTASSIUM SERPL-SCNC: 4 MMOL/L — SIGNIFICANT CHANGE UP (ref 3.5–5.3)
RBC # BLD: 4.86 M/UL — SIGNIFICANT CHANGE UP (ref 4.2–5.8)
RBC # FLD: 11.9 % — SIGNIFICANT CHANGE UP (ref 10.3–14.5)
SODIUM SERPL-SCNC: 136 MMOL/L — SIGNIFICANT CHANGE UP (ref 135–145)
WBC # BLD: 7.85 K/UL — SIGNIFICANT CHANGE UP (ref 3.8–10.5)
WBC # FLD AUTO: 7.85 K/UL — SIGNIFICANT CHANGE UP (ref 3.8–10.5)

## 2021-04-27 RX ORDER — CYCLOBENZAPRINE HYDROCHLORIDE 10 MG/1
0 TABLET, FILM COATED ORAL
Qty: 0 | Refills: 0 | DISCHARGE

## 2021-04-27 RX ORDER — GABAPENTIN 400 MG/1
1 CAPSULE ORAL
Qty: 30 | Refills: 0
Start: 2021-04-27 | End: 2021-05-26

## 2021-04-27 RX ORDER — MIDODRINE HYDROCHLORIDE 2.5 MG/1
1 TABLET ORAL
Qty: 90 | Refills: 0
Start: 2021-04-27 | End: 2021-05-26

## 2021-04-27 RX ORDER — ACETAMINOPHEN 500 MG
650 TABLET ORAL ONCE
Refills: 0 | Status: COMPLETED | OUTPATIENT
Start: 2021-04-27 | End: 2021-04-27

## 2021-04-27 RX ORDER — SODIUM CHLORIDE 9 MG/ML
1000 INJECTION INTRAMUSCULAR; INTRAVENOUS; SUBCUTANEOUS
Refills: 0 | Status: DISCONTINUED | OUTPATIENT
Start: 2021-04-27 | End: 2021-04-27

## 2021-04-27 RX ORDER — GABAPENTIN 400 MG/1
2 CAPSULE ORAL
Qty: 60 | Refills: 0
Start: 2021-04-27 | End: 2021-05-26

## 2021-04-27 RX ADMIN — Medication 650 MILLIGRAM(S): at 09:45

## 2021-04-27 RX ADMIN — MIDODRINE HYDROCHLORIDE 5 MILLIGRAM(S): 2.5 TABLET ORAL at 06:41

## 2021-04-27 RX ADMIN — LIDOCAINE 1 PATCH: 4 CREAM TOPICAL at 11:47

## 2021-04-27 RX ADMIN — Medication 650 MILLIGRAM(S): at 10:42

## 2021-04-27 RX ADMIN — SODIUM CHLORIDE 100 MILLILITER(S): 9 INJECTION INTRAMUSCULAR; INTRAVENOUS; SUBCUTANEOUS at 09:46

## 2021-04-27 RX ADMIN — MIDODRINE HYDROCHLORIDE 5 MILLIGRAM(S): 2.5 TABLET ORAL at 11:48

## 2021-04-27 RX ADMIN — LIDOCAINE 1 PATCH: 4 CREAM TOPICAL at 00:00

## 2021-04-27 RX ADMIN — MIDODRINE HYDROCHLORIDE 5 MILLIGRAM(S): 2.5 TABLET ORAL at 17:32

## 2021-04-27 RX ADMIN — GABAPENTIN 100 MILLIGRAM(S): 400 CAPSULE ORAL at 11:46

## 2021-04-27 RX ADMIN — Medication 2: at 12:58

## 2021-04-27 NOTE — PROGRESS NOTE ADULT - SUBJECTIVE AND OBJECTIVE BOX
[facility] Neurological Care [facility]      Seen earlier today, and examined.  - Today, patient is without complaints.           *****MEDICATIONS: Current medication reviewed and documented.    MEDICATIONS  (STANDING):  dextrose 40% Gel 15 Gram(s) Oral once  dextrose 5%. 1000 milliLiter(s) (50 mL/Hr) IV Continuous <Continuous>  dextrose 5%. 1000 milliLiter(s) (100 mL/Hr) IV Continuous <Continuous>  dextrose 50% Injectable 25 Gram(s) IV Push once  dextrose 50% Injectable 12.5 Gram(s) IV Push once  dextrose 50% Injectable 25 Gram(s) IV Push once  gabapentin 100 milliGRAM(s) Oral daily  gabapentin 200 milliGRAM(s) Oral at bedtime  glucagon  Injectable 1 milliGRAM(s) IntraMuscular once  insulin lispro (ADMELOG) corrective regimen sliding scale   SubCutaneous three times a day before meals  insulin lispro (ADMELOG) corrective regimen sliding scale   SubCutaneous at bedtime  lidocaine   Patch 1 Patch Transdermal daily  midodrine. 5 milliGRAM(s) Oral three times a day  sodium chloride 0.9%. 1000 milliLiter(s) (100 mL/Hr) IV Continuous <Continuous>  sodium chloride 0.9%. 1000 milliLiter(s) (100 mL/Hr) IV Continuous <Continuous>  sodium chloride 0.9%. 1000 milliLiter(s) (100 mL/Hr) IV Continuous <Continuous>    MEDICATIONS  (PRN):          ***** VITAL SIGNS:  T(F): 98.2 (04-27-21 @ 11:20), Max: 98.2 (04-27-21 @ 11:20)  HR: 72 (04-27-21 @ 11:20) (72 - 82)  BP: 121/73 (04-27-21 @ 11:20) (107/55 - 127/67)  RR: 18 (04-27-21 @ 11:20) (18 - 18)  SpO2: 96% (04-27-21 @ 11:20) (96% - 100%)  Wt(kg): --  ,   I&O's Summary           *****PHYSICAL EXAM:  Alert oriented x 3   Attention comprehension are fair. Able to name, repeat, read without any difficulty.   Able to follow 3 step commands.     EOMI fundi not visualized,  VFF to confrontration  No facial asymmetry   Tongue is midline   Palate elevates symmetrically   Moving all 4 ext symmetrically      sensation is grossly symmetric  Gait : not assessed.         *****LAB AND IMAGING:                        15.7   7.85  )-----------( 223      ( 27 Apr 2021 07:07 )             45.7               04-27    136  |  104  |  6<L>  ----------------------------<  110<H>  4.0   |  23  |  0.67    Ca    9.0      27 Apr 2021 07:07  Phos  3.3     04-27  Mg     2.1     04-27                           [All pertinent recent Imaging/Reports reviewed]           *****A S S E S S M E N T   A N D   P L A N :    c Patient is a 62 y/o M PMH DM2, lap band surg 1/2021 had neg cardiac workup w/ stress + echo 9/2020 @ [facility] p/w syncope 4/21 in AM. Reports feeling groggy upon awakening on 4/21, felt lightheaded when standing up, went back to bed. Woke up several hours later, ate breakfast, stood up, felt lightheaded again, unsteady, woke up on the floor. Reports no recent illness, regular PO intake, FS normal at the times of incidents.     Problem/Recommendations 1: dizziness poorly described      orthostatic hypotension      cta without any occlusion in the head or neck   mri brain  no acute infarct   given weight loss of 55 lbs ( lap band)   thiamine iv 500 q 8 h   orthostatic hypotension likely sec diabetic autonomic neuropathy    b12 low, likely can be contributory to orthostatic hypotension,will supplement parenterally    oob to chair daily   currently on midodrine titration      Problem/Recommendations 2:  back pain   right sided   mri ls pine c/w l4/5 disc with mild thecal sac indentation   lidocaine patch   gabapentin 100 am 200 qhs       pt asked to call wife, as per wife Danielle Nguyễn , pt has prior hx of heavy crack cocaine use in the past, she is also concerned about his excessive diet coke use 10-14 cups a day.  She reports that he is extremely noncompliant to diet/lifestyle restrictions        Thank you for allowing me to participate in the care of this patient. Will continue to follow patient periodically. Please do not hesitate to call me if you have any  questions or if there has been a change in patients neurological status     ________________  Anahy Monaco MD  [facility] Neurological [facility] ([facility]  [phone]      33 minutes spent on total encounter; more than 50 % of the visit was  spent counseling about plan of care, compliance to diet/exercise and medication regimen and or  coordinating care by the attending physician.      It is advised that stroke patients follow up with VIKTORIA Mensah @ [phone] in 1- 2 weeks.   Others please follow up with Dr. Michael Nissenbaum [phone]

## 2021-04-27 NOTE — PROGRESS NOTE ADULT - PROVIDER SPECIALTY LIST ADULT
Cardiology
Electrophysiology
Electrophysiology
Cardiology
Cardiology
Electrophysiology
Hospitalist
Internal Medicine
Neurology
Electrophysiology
Electrophysiology
Internal Medicine
Internal Medicine

## 2021-04-27 NOTE — PROGRESS NOTE ADULT - SUBJECTIVE AND OBJECTIVE BOX
EP Attending    HISTORY OF PRESENT ILLNESS:   Patient is a 60 y/o M PMH DM2, lap band surg 1/2021 had neg cardiac workup w/ stress + echo 9/2020 @ [facility] p/w syncope 4/21 in AM. Reports feeling groggy upon awakening on 4/21, felt lightheaded when standing up, went back to bed. Woke up several hours later, ate breakfast, stood up, felt lightheaded again, unsteady, woke up on the floor. Reports no recent illness, regular PO intake, FS normal at the times of incidents. (22 Apr 2021 05:10)    Mr Hahn reports undergoing lap-band surgery ~4-5 months ago, and has done well since with ~50lbs of weight loss.  Often regurgitates solid foods that don't fit thru the band. He reports being happy w/ results and is working his diet around this issue.  He describes multiple episodes of postural collapse with loss of consciousness on the day of admission, starting first-thing in the morning upon waking up, and not improving with eating a few small meals and having further rest in bed.  He describes what initially sounds like a vertiginous component of dysequilibrium, "walking like hes drunk... bouncing from wall to wall unable to maintain a stable direction", but then develops a presyncopal lightheadedness with blurry vision and feeling 'foggy'.  He then recalls gaining consciousness on the floor without recalling falling down.  No significant injuries sustained.  Symptoms worsen with lying-->siting, and sitting-->standing.  He has only transferred from bed-to-wheelchair and vice versa in the ED, with symptoms, and has not walked in the ED for fear of fainting again.  He has no prior episodes of lightheadedness or fainting.    He is not experiencing any palpitations, angina, orthopnea/PND, pleuritic chest pain or leg cramping.  Has chronic low-back-pain that is active at this time.  He does not drink smoke or use drugs.  A 10 pt ROS is otherwise negative.    4/25- Reports ongoing symptoms since admit.  FEels dizzy when sitting up or standing up.  Denies Palpitations.    4/26- uneventful overnight, still dizzy with ambulation and objectively with orthostatic hypotension.  4/27- no new complaints.  less hypotension on standing.  working on going home.    Review of Systems:   Constitutional: [ ] fevers, [ ] chills.   Skin: [ ] dry skin. [ ] rashes.  Psychiatric: [ ] depression, [ ] anxiety.   Gastrointestinal: [ ] BRBPR, [ ] melena.   Neurological: [ ] confusion. [ ] seizures. [ ] shuffling gait.   Ears,Nose,Mouth and Throat: [ ] ear pain [ ] sore throat.   Eyes: [ ] diplopia.   Respiratory: [ ] hemoptysis. [ ] shortness of breath  Cardiovascular: See HPI above  Hematologic/Lymphatic: [ ] anemia. [ ] painful nodes. [ ] prolonged bleeding.   Genitourinary: [ ] hematuria. [ ] flank pain.   Endocrine: [ ] significant change in weight. [ ] intolerance to heat and cold.     Review of systems [x] otherwise negative, [ ] otherwise unable to obtain    FH: no family history of sudden cardiac death in first degree relatives    SH: [ ] tobacco, [ ] alcohol, [ ] drugs    dextrose 40% Gel 15 Gram(s) Oral once  dextrose 5%. 1000 milliLiter(s) IV Continuous <Continuous>  dextrose 5%. 1000 milliLiter(s) IV Continuous <Continuous>  dextrose 50% Injectable 25 Gram(s) IV Push once  dextrose 50% Injectable 12.5 Gram(s) IV Push once  dextrose 50% Injectable 25 Gram(s) IV Push once  gabapentin 100 milliGRAM(s) Oral daily  gabapentin 200 milliGRAM(s) Oral at bedtime  glucagon  Injectable 1 milliGRAM(s) IntraMuscular once  insulin lispro (ADMELOG) corrective regimen sliding scale   SubCutaneous three times a day before meals  insulin lispro (ADMELOG) corrective regimen sliding scale   SubCutaneous at bedtime  lidocaine   Patch 1 Patch Transdermal daily  midodrine. 5 milliGRAM(s) Oral three times a day  sodium chloride 0.9%. 1000 milliLiter(s) IV Continuous <Continuous>  sodium chloride 0.9%. 1000 milliLiter(s) IV Continuous <Continuous>  sodium chloride 0.9%. 1000 milliLiter(s) IV Continuous <Continuous>                          15.7   7.85  )-----------( 223      ( 27 Apr 2021 07:07 )             45.7       04-27    136  |  104  |  6<L>  ----------------------------<  110<H>  4.0   |  23  |  0.67    Ca    9.0      27 Apr 2021 07:07  Phos  3.3     04-27  Mg     2.1     04-27      T(C): 36.8 (04-27-21 @ 11:20), Max: 36.8 (04-27-21 @ 11:20)  HR: 72 (04-27-21 @ 11:20) (72 - 82)  BP: 121/73 (04-27-21 @ 11:20) (107/55 - 127/67)  RR: 18 (04-27-21 @ 11:20) (18 - 18)  SpO2: 96% (04-27-21 @ 11:20) (96% - 100%)  Wt(kg): --    I&O's Summary    General: Well nourished, no acute distress, alert and oriented x 3  Head: normocephalic, no trauma  Neck: no JVD, no bruit, supple, not enlarged  CV: S1S2, no S3, regular rate, rhythm is SINUS, no murmurs.    Lungs: clear BL, no rales or wheezes  Abdomen: bowel sounds +, soft, nontender, nondistended  Extremities: no clubbing, cyanosis or edema  Neuro: Moves all 4 extremities, sensation intact x 4 extremities  Skin: warm and moist, normal turgor  Psych: Mood and affect are appropriate for circumstances  MSK: normal range of motion and strength x4 extremities.    TELEMETRY: NSR.  Orthostatic increase in HR from 82 --> 98bpm, from lying to sitting, reproducible.	    ECG: NSR    < from: Transthoracic Echocardiogram (04.24.21 @ 08:37) >  CONCLUSIONS:  1. Mitral annular calcification and calcified mitral  leaflets with normal diastolic opening. Minimal mitral  regurgitation.  2. Moderate concentric left ventricular hypertrophy.  3. Normal left ventricular systolic function. No segmental  wall motion abnormalities.  4. Mild diastolic dysfunction (Stage I).  5. The right ventricle is not well visualized; grossly  normal right ventricular systolic function.  6. Normal tricuspid valve. Minimal tricuspid regurgitation.  7. Estimated pulmonary artery systolic pressure equals 22  mm Hg, assuming right atrial pressure equals 10  mm Hg,  consistent with normal pulmonary pressures.  *** No previous Echo exam.  ------------------------------------------------------------------------  Confirmed on  4/24/2021 - 13:12:37 by Kenny Xie MD, [facility],  [facility], [facility]    < end of copied text >    ASSESSMENT/PLAN: 	61y Male subacutely s/p lap-band surgery, presented with syncopal episodes on day of arrival.  Mechanism of syncope is consistent with orthostasis.      --tele events overnight were only c/w SR/ brief Sinus tachycardia to 130  --less symptoms w/ standing today.  --Neuro eval appreciated, c/w autonomic dysfunction in the setting of diabetes.  --Echo with structurally normal heart  --ambulatory cardiac monitoring once seen in the office.    David Avery M.D.  Cardiac Electrophysiology  [phone]

## 2021-04-27 NOTE — PROGRESS NOTE ADULT - REASON FOR ADMISSION
syncope

## 2021-04-27 NOTE — PROGRESS NOTE ADULT - NSICDXPILOT_GEN_ALL_CORE
[place]

## 2021-04-27 NOTE — PROGRESS NOTE ADULT - SUBJECTIVE AND OBJECTIVE BOX
chief complaint: syncope     extended hpi: 60 y/o M PMH DM2 x 10 years, Diabetic neuropathy in L foot, s/p lap band surg 1/2021,  reports neg cardiac workup w/ stress + echo 9/2020 @ [facility] admitted with syncope.     S: no chest pain or sob; dizziness resolved; ros otherwise negative.     Review of Systems:   Constitutional: [ ] fevers, [ ] chills.   Skin: [ ] dry skin. [ ] rashes.  Psychiatric: [ ] depression, [ ] anxiety.   Gastrointestinal: [ ] BRBPR, [ ] melena.   Neurological: [ ] confusion. [ ] seizures. [ ] shuffling gait.   Ears,Nose,Mouth and Throat: [ ] ear pain [ ] sore throat.   Eyes: [ ] diplopia.   Respiratory: [ ] hemoptysis. [ ] shortness of breath  Cardiovascular: See HPI above  Hematologic/Lymphatic: [ ] anemia. [ ] painful nodes. [ ] prolonged bleeding.   Genitourinary: [ ] hematuria. [ ] flank pain.   Endocrine: [ ] significant change in weight. [ ] intolerance to heat and cold.     Review of systems [x ] otherwise negative, [ ] otherwise unable to obtain    FH: no family history of sudden cardiac death in first degree relatives    SH: [ ] tobacco, [ ] alcohol, [ ] drugs    dextrose 40% Gel 15 Gram(s) Oral once  dextrose 5%. 1000 milliLiter(s) IV Continuous <Continuous>  dextrose 5%. 1000 milliLiter(s) IV Continuous <Continuous>  dextrose 50% Injectable 25 Gram(s) IV Push once  dextrose 50% Injectable 12.5 Gram(s) IV Push once  dextrose 50% Injectable 25 Gram(s) IV Push once  gabapentin 100 milliGRAM(s) Oral daily  gabapentin 200 milliGRAM(s) Oral at bedtime  glucagon  Injectable 1 milliGRAM(s) IntraMuscular once  insulin lispro (ADMELOG) corrective regimen sliding scale   SubCutaneous three times a day before meals  insulin lispro (ADMELOG) corrective regimen sliding scale   SubCutaneous at bedtime  lidocaine   Patch 1 Patch Transdermal daily  midodrine. 5 milliGRAM(s) Oral three times a day  sodium chloride 0.9%. 1000 milliLiter(s) IV Continuous <Continuous>  sodium chloride 0.9%. 1000 milliLiter(s) IV Continuous <Continuous>  sodium chloride 0.9%. 1000 milliLiter(s) IV Continuous <Continuous>                            15.7   7.85  )-----------( 223      ( 27 Apr 2021 07:07 )             45.7       04-27    136  |  104  |  6<L>  ----------------------------<  110<H>  4.0   |  23  |  0.67    Ca    9.0      27 Apr 2021 07:07  Phos  3.3     04-27  Mg     2.1     04-27              T(C): 36.8 (04-27-21 @ 11:20), Max: 36.8 (04-27-21 @ 11:20)  HR: 72 (04-27-21 @ 11:20) (72 - 82)  BP: 121/73 (04-27-21 @ 11:20) (107/55 - 127/67)  RR: 18 (04-27-21 @ 11:20) (18 - 18)  SpO2: 96% (04-27-21 @ 11:20) (96% - 100%)  Wt(kg): --    I&O's Summary      General: Well nourished in no acute distress. Alert and Oriented * 3.   Head: Normocephalic and atraumatic.   Neck: No JVD. No bruits. Supple. Does not appear to be enlarged.   Cardiovascular: + S1,S2 ; RRR Soft systolic murmur at the left lower sternal border. No rubs noted.    Lungs: CTA b/l. No rhonchi, rales or wheezes.   Abdomen: + BS, soft. Non tender. Non distended. No rebound. No guarding.   Extremities: No clubbing/cyanosis/edema.   Neurologic: Moves all four extremities. Full range of motion.   Skin: Warm and moist. The patient's skin has normal elasticity and good skin turgor.   Psychiatric: Appropriate mood and affect.  Musculoskeletal: Normal range of motion, normal strength    Tele: SR     A/P: 60 y/o M PMH DM2 x 10 years, Diabetic neuropathy in L foot, s/p lap band surg 1/2021,  reports neg cardiac workup w/ stress + echo 9/2020 @ [facility] admitted with syncope.     -appreciate neurology evaluation   -all risks, benefits, indications, contraindications, possible inferior alternative options of midodrine explained to the patient.  He understood everything and elected to start midodrine.   -orthostatics and symptoms have resolved since initiation of midodrine   -TTE with normal LV function   -no further cardiac workup indicated at this time  -dc planning   -pt. to follow up with his primary cardiologist at [place] after discharge  -discussed with wife in detail at patient's request who states she will make close follow up appointment with his cardiologist at [place]     Dilan Rolon MD

## 2021-04-27 NOTE — DISCHARGE NOTE NURSING/CASE MANAGEMENT/SOCIAL WORK - PATIENT PORTAL LINK FT
You can access the FollowMyHealth Patient Portal offered by [facility] by registering at the following website: [email or web address]. By joining [employer]’s FollowMyHealth portal, you will also be able to view your health information using other applications (apps) compatible with our system.

## 2021-04-29 LAB — METHYLMALONATE SERPL-SCNC: 157 NMOL/L — SIGNIFICANT CHANGE UP (ref 0–378)

## 2025-02-12 NOTE — DISCHARGE NOTE NURSING/CASE MANAGEMENT/SOCIAL WORK - NSDPLANG ASIS_GEN_ALL_CORE
February 12, 2025    To the parents/guardians of:  Fara Quintero   [address]  [facility] [address]      Dear Fara Quintero:    Our records indicate that you have missed, failed to cancel, arrived late and couldn't be seen, or arrived late and provider made an exception to see, one or more appointments with us during the last 12 months.     [facility] requests that patients notify the provider's office of cancellations 24 hours prior to their appointment, and to arrive within 10 minutes of the scheduled appointment time.  For appointments scheduled on Monday, please notify the appointment desk by noon on Saturday.      Cancelling your appointment with proper notice allows other patients the opportunity to be seen. The [facility] policy states that patient may be dismissed from the practice after three missed appointments within a 12-month period.    The following are your missed appointments:  Recent No Show Appointments       Date Time Department Provider    2/11/2025  4:25 PM [facility] [address] CRESENCIO CORONEL             Your health care is important to us.  Please call our office at your earliest convenience to reschedule your appointment or to inform us of any possible scheduling errors.      We look forward to hearing from you soon. Thank you for understanding our position in regards to this problem. If you have any questions, please don't hesitate to contact us.       Sincerely,  [facility]           No